# Patient Record
Sex: MALE | Race: WHITE | URBAN - METROPOLITAN AREA
[De-identification: names, ages, dates, MRNs, and addresses within clinical notes are randomized per-mention and may not be internally consistent; named-entity substitution may affect disease eponyms.]

---

## 2017-02-16 RX ORDER — LISINOPRIL 20 MG/1
TABLET ORAL
Qty: 30 TABLET | Refills: 2 | Status: SHIPPED | OUTPATIENT
Start: 2017-02-16 | End: 2017-06-20

## 2017-02-16 NOTE — TELEPHONE ENCOUNTER
Requesting Lisinopril refill, noted pt seen in Dr. Leighann Saeed office 10/18/16    Prescription refilled per [facility] refill protocol    Hypertensive Medications  Protocol Criteria:  · Appointment scheduled in the past 6 months or in the next 3 months  · BMP or CMP

## 2017-05-25 NOTE — TELEPHONE ENCOUNTER
Pt's LOV 4/26/16. Pt requesting refill. Please advise if you would prefer pt RTC or if you will refill.

## 2017-05-26 RX ORDER — MONTELUKAST SODIUM 10 MG/1
TABLET ORAL
Qty: 30 TABLET | Refills: 3 | Status: SHIPPED | OUTPATIENT
Start: 2017-05-26 | End: 2017-07-11 | Stop reason: ALTCHOICE

## 2017-05-31 NOTE — TELEPHONE ENCOUNTER
Hypertensive Medications  Protocol Criteria:  · Appointment scheduled in the past 6 months or in the next 3 months  · BMP or CMP in the past 12 months  · Creatinine result < 2  Recent Visits       Provider Department Primary Dx    1 year ago Kathya Coats

## 2017-06-02 RX ORDER — LISINOPRIL 20 MG/1
TABLET ORAL
Qty: 30 TABLET | Refills: 0 | OUTPATIENT
Start: 2017-06-02

## 2017-06-02 NOTE — TELEPHONE ENCOUNTER
Please contact patient. Remind him he overdue for fasting labs recommended at 10/2016 follow-up. Also overdue for 6 month hypertension check.   Okay to refill ×1 month if running out now, but must get these labs done

## 2017-06-17 ENCOUNTER — APPOINTMENT (OUTPATIENT)
Dept: LAB | Age: 62
End: 2017-06-17
Attending: FAMILY MEDICINE
Payer: COMMERCIAL

## 2017-06-17 DIAGNOSIS — E78.5 HYPERLIPIDEMIA, UNSPECIFIED HYPERLIPIDEMIA TYPE: ICD-10-CM

## 2017-06-17 DIAGNOSIS — R73.9 HYPERGLYCEMIA: ICD-10-CM

## 2017-06-17 PROCEDURE — 80076 HEPATIC FUNCTION PANEL: CPT

## 2017-06-17 PROCEDURE — 82947 ASSAY GLUCOSE BLOOD QUANT: CPT

## 2017-06-17 PROCEDURE — 83036 HEMOGLOBIN GLYCOSYLATED A1C: CPT

## 2017-06-17 PROCEDURE — 36415 COLL VENOUS BLD VENIPUNCTURE: CPT

## 2017-06-17 PROCEDURE — 80061 LIPID PANEL: CPT

## 2017-06-17 NOTE — TELEPHONE ENCOUNTER
HAZEL please contact pt to come in for NV for labs that are overdue. He is also due for a HTN follow up with [facility]. Meds refilled x 1 month will not be refilled if labs are not done.

## 2017-06-19 ENCOUNTER — TELEPHONE (OUTPATIENT)
Dept: FAMILY MEDICINE CLINIC | Facility: CLINIC | Age: 62
End: 2017-06-19

## 2017-06-19 NOTE — TELEPHONE ENCOUNTER
Contacted pt to schedule a 6 month follow up and during the call pt asked for his lab results. Please advise.

## 2017-06-19 NOTE — TELEPHONE ENCOUNTER
Dr. Zeke Mauricio can you advise on refill request. Pt had labs done on Saturday and states he will call back for a future appointment.

## 2017-06-19 NOTE — TELEPHONE ENCOUNTER
Pt already had lab work done on Saturday. Pt states he will call back to book an appt, but he is asking if his medication can be refilled please?

## 2017-06-20 RX ORDER — LISINOPRIL 20 MG/1
TABLET ORAL
Qty: 30 TABLET | Refills: 2 | Status: SHIPPED | OUTPATIENT
Start: 2017-06-20 | End: 2017-09-17

## 2017-06-20 NOTE — TELEPHONE ENCOUNTER
Verified pt name and . Reviewed test results and recommendations with pt as [facility] [place] [email or web address].  Pt states he had taken Atorvastatin in the past but thinks he had a side effect from med, had bad stomach cramps so he stopped ta

## 2017-07-11 ENCOUNTER — OFFICE VISIT (OUTPATIENT)
Dept: FAMILY MEDICINE CLINIC | Facility: CLINIC | Age: 62
End: 2017-07-11

## 2017-07-11 VITALS
HEART RATE: 71 BPM | DIASTOLIC BLOOD PRESSURE: 73 MMHG | WEIGHT: 204.38 LBS | SYSTOLIC BLOOD PRESSURE: 111 MMHG | TEMPERATURE: 98 F | HEIGHT: 71.3 IN | BODY MASS INDEX: 28.3 KG/M2 | RESPIRATION RATE: 17 BRPM

## 2017-07-11 DIAGNOSIS — Z86.010 HISTORY OF COLON POLYPS: ICD-10-CM

## 2017-07-11 DIAGNOSIS — R73.03 PREDIABETES: Primary | ICD-10-CM

## 2017-07-11 DIAGNOSIS — Z87.09 HISTORY OF NASAL POLYP: ICD-10-CM

## 2017-07-11 DIAGNOSIS — I10 ESSENTIAL HYPERTENSION WITH GOAL BLOOD PRESSURE LESS THAN 140/90: ICD-10-CM

## 2017-07-11 DIAGNOSIS — E78.5 HYPERLIPIDEMIA, UNSPECIFIED HYPERLIPIDEMIA TYPE: ICD-10-CM

## 2017-07-11 PROCEDURE — 99212 OFFICE O/P EST SF 10 MIN: CPT | Performed by: FAMILY MEDICINE

## 2017-07-11 PROCEDURE — 99214 OFFICE O/P EST MOD 30 MIN: CPT | Performed by: FAMILY MEDICINE

## 2017-07-11 RX ORDER — SIMVASTATIN 20 MG
20 TABLET ORAL NIGHTLY
Qty: 90 TABLET | Refills: 1 | Status: SHIPPED | OUTPATIENT
Start: 2017-07-11 | End: 2018-03-12 | Stop reason: ALTCHOICE

## 2017-07-11 NOTE — PROGRESS NOTES
HPI:    Patient ID: Yessy Dia is a 63year old male. HTN   This is a chronic problem. The current episode started more than 1 year ago. The problem is unchanged. The problem is controlled.  Pertinent negatives include no anxiety, blurred vision, jaylon Prediabetes–patient presents for follow-up on elevated blood sugars. Fasting blood sugar 112, hemoglobin A1c 5.9. He is overweight, no regular exercise. Has been on steroids intermittently in the past for nasal polyps.   Discussed diagnosis and recom

## 2017-07-17 ENCOUNTER — OFFICE VISIT (OUTPATIENT)
Dept: OTOLARYNGOLOGY | Facility: CLINIC | Age: 62
End: 2017-07-17

## 2017-07-17 VITALS
HEIGHT: 72 IN | TEMPERATURE: 98 F | WEIGHT: 204 LBS | SYSTOLIC BLOOD PRESSURE: 111 MMHG | DIASTOLIC BLOOD PRESSURE: 70 MMHG | BODY MASS INDEX: 27.63 KG/M2

## 2017-07-17 DIAGNOSIS — J33.9 NASAL POLYPOSIS: Primary | ICD-10-CM

## 2017-07-17 PROCEDURE — 99212 OFFICE O/P EST SF 10 MIN: CPT | Performed by: OTOLARYNGOLOGY

## 2017-07-17 PROCEDURE — 99214 OFFICE O/P EST MOD 30 MIN: CPT | Performed by: OTOLARYNGOLOGY

## 2017-07-17 RX ORDER — ASPIRIN 81 MG/1
TABLET ORAL
COMMUNITY
Start: 2017-07-11 | End: 2017-07-17

## 2017-07-17 RX ORDER — PREDNISONE 10 MG/1
TABLET ORAL
Qty: 44 TABLET | Refills: 0 | Status: SHIPPED | OUTPATIENT
Start: 2017-07-17 | End: 2018-03-12 | Stop reason: ALTCHOICE

## 2017-07-18 NOTE — PROGRESS NOTES
Margarette Ulloa is a 63year old male. Patient presents with:  Nose Problem: trouble breathing through both nostrils, left nostril is worse, pt states he has nasal polyps       HISTORY OF PRESENT ILLNESS  Long history of chronic sinusitis and polyposis.  Sh abated since then. Currently on Singulair and not using loratadine D or fluticasone. He wishes to discuss getting some steroids for this chronic problem. He also wishes to discuss surgery further.       Social History    Marital status:  SYSTEMS    System Neg/Pos Details   Constitutional Negative Fatigue, fever and weight loss. ENMT Negative Drooling. Eyes Negative Blurred vision and vision changes. Respiratory Negative Dyspnea and wheezing.    Cardio Negative Chest pain, irregular he Septum -Normal  Turbinates - Right: Normal, Left: Normal.  Bilateral nasal polyposis left greater than right        Current Outpatient Prescriptions:   •  predniSONE 10 MG Oral Tab, 6 tablets daily day one through 5, 4 tablets daily on days  6 and 7, 2 tab

## 2017-09-18 RX ORDER — LISINOPRIL 20 MG/1
TABLET ORAL
Qty: 30 TABLET | Refills: 2 | Status: SHIPPED | OUTPATIENT
Start: 2017-09-18 | End: 2017-12-14

## 2017-12-14 RX ORDER — LISINOPRIL 20 MG/1
TABLET ORAL
Qty: 30 TABLET | Refills: 0 | Status: SHIPPED | OUTPATIENT
Start: 2017-12-14 | End: 2018-02-18

## 2018-02-20 RX ORDER — LISINOPRIL 20 MG/1
TABLET ORAL
Qty: 30 TABLET | Refills: 0 | Status: SHIPPED | OUTPATIENT
Start: 2018-02-20 | End: 2018-03-12

## 2018-02-20 NOTE — TELEPHONE ENCOUNTER
Please contact patient to make appointment within the next month, obtain labs ordered 7/2017 fasting prior to appointment

## 2018-02-28 ENCOUNTER — APPOINTMENT (OUTPATIENT)
Dept: LAB | Age: 63
End: 2018-02-28
Attending: FAMILY MEDICINE
Payer: COMMERCIAL

## 2018-02-28 DIAGNOSIS — R73.03 PREDIABETES: ICD-10-CM

## 2018-02-28 DIAGNOSIS — E78.5 HYPERLIPIDEMIA, UNSPECIFIED HYPERLIPIDEMIA TYPE: ICD-10-CM

## 2018-02-28 LAB
ALBUMIN SERPL BCP-MCNC: 4.4 G/DL (ref 3.5–4.8)
ALBUMIN/GLOB SERPL: 1.6 {RATIO} (ref 1–2)
ALP SERPL-CCNC: 65 U/L (ref 32–100)
ALT SERPL-CCNC: 23 U/L (ref 17–63)
ANION GAP SERPL CALC-SCNC: 8 MMOL/L (ref 0–18)
AST SERPL-CCNC: 24 U/L (ref 15–41)
BILIRUB SERPL-MCNC: 1 MG/DL (ref 0.3–1.2)
BUN SERPL-MCNC: 16 MG/DL (ref 8–20)
BUN/CREAT SERPL: 18.2 (ref 10–20)
CALCIUM SERPL-MCNC: 9.5 MG/DL (ref 8.5–10.5)
CHLORIDE SERPL-SCNC: 102 MMOL/L (ref 95–110)
CHOLEST SERPL-MCNC: 221 MG/DL (ref 110–200)
CO2 SERPL-SCNC: 28 MMOL/L (ref 22–32)
CREAT SERPL-MCNC: 0.88 MG/DL (ref 0.5–1.5)
GLOBULIN PLAS-MCNC: 2.7 G/DL (ref 2.5–3.7)
GLUCOSE SERPL-MCNC: 80 MG/DL (ref 70–99)
HDLC SERPL-MCNC: 52 MG/DL
LDLC SERPL CALC-MCNC: 154 MG/DL (ref 0–99)
NONHDLC SERPL-MCNC: 169 MG/DL
OSMOLALITY UR CALC.SUM OF ELEC: 286 MOSM/KG (ref 275–295)
PATIENT FASTING: YES
POTASSIUM SERPL-SCNC: 4.2 MMOL/L (ref 3.3–5.1)
PROT SERPL-MCNC: 7.1 G/DL (ref 5.9–8.4)
SODIUM SERPL-SCNC: 138 MMOL/L (ref 136–144)
TRIGL SERPL-MCNC: 74 MG/DL (ref 1–149)

## 2018-02-28 PROCEDURE — 80061 LIPID PANEL: CPT

## 2018-02-28 PROCEDURE — P9045 ALBUMIN (HUMAN), 5%, 250 ML: HCPCS

## 2018-02-28 PROCEDURE — 83036 HEMOGLOBIN GLYCOSYLATED A1C: CPT

## 2018-02-28 PROCEDURE — 80053 COMPREHEN METABOLIC PANEL: CPT

## 2018-02-28 PROCEDURE — 36415 COLL VENOUS BLD VENIPUNCTURE: CPT

## 2018-03-01 LAB — HBA1C MFR BLD: 5.9 % (ref 4–6)

## 2018-03-12 ENCOUNTER — APPOINTMENT (OUTPATIENT)
Dept: LAB | Age: 63
End: 2018-03-12
Attending: FAMILY MEDICINE
Payer: COMMERCIAL

## 2018-03-12 ENCOUNTER — OFFICE VISIT (OUTPATIENT)
Dept: FAMILY MEDICINE CLINIC | Facility: CLINIC | Age: 63
End: 2018-03-12

## 2018-03-12 VITALS
RESPIRATION RATE: 17 BRPM | HEART RATE: 80 BPM | BODY MASS INDEX: 27.06 KG/M2 | TEMPERATURE: 98 F | WEIGHT: 199.81 LBS | DIASTOLIC BLOOD PRESSURE: 75 MMHG | SYSTOLIC BLOOD PRESSURE: 108 MMHG | HEIGHT: 72 IN

## 2018-03-12 DIAGNOSIS — Z00.00 ROUTINE PHYSICAL EXAMINATION: ICD-10-CM

## 2018-03-12 DIAGNOSIS — E78.5 HYPERLIPIDEMIA, UNSPECIFIED HYPERLIPIDEMIA TYPE: ICD-10-CM

## 2018-03-12 DIAGNOSIS — Z00.00 ROUTINE PHYSICAL EXAMINATION: Primary | ICD-10-CM

## 2018-03-12 DIAGNOSIS — I10 ESSENTIAL HYPERTENSION WITH GOAL BLOOD PRESSURE LESS THAN 140/90: ICD-10-CM

## 2018-03-12 DIAGNOSIS — Z87.09 HISTORY OF NASAL POLYP: ICD-10-CM

## 2018-03-12 DIAGNOSIS — Z86.010 HISTORY OF COLON POLYPS: ICD-10-CM

## 2018-03-12 DIAGNOSIS — R73.03 PREDIABETES: ICD-10-CM

## 2018-03-12 PROCEDURE — 99396 PREV VISIT EST AGE 40-64: CPT | Performed by: FAMILY MEDICINE

## 2018-03-12 PROCEDURE — 86803 HEPATITIS C AB TEST: CPT

## 2018-03-12 PROCEDURE — 36415 COLL VENOUS BLD VENIPUNCTURE: CPT

## 2018-03-12 RX ORDER — LISINOPRIL 20 MG/1
20 TABLET ORAL
Qty: 90 TABLET | Refills: 3 | Status: SHIPPED | OUTPATIENT
Start: 2018-03-12 | End: 2019-03-27

## 2018-03-12 RX ORDER — PRAVASTATIN SODIUM 40 MG
40 TABLET ORAL NIGHTLY
Qty: 90 TABLET | Refills: 1 | Status: SHIPPED | OUTPATIENT
Start: 2018-03-12 | End: 2018-09-24

## 2018-03-12 NOTE — PROGRESS NOTES
HPI:    Patient ID: Jean-Claude Terrell is a 63year old male. HPI    Review of Systems   Constitutional: Negative. HENT: Positive for congestion, sinus pain and sinus pressure. Respiratory: Negative. Cardiovascular: Negative.     Gastrointestinal: N walking. Hyperlipidemia–again discussed recommendation for statins.   Patient agrees to try pravastatin 20 mg daily (intolerant of atorvastatin in the past, did not try simvastatin)    Essential hypertension–well controlled with lisinopril 20 mg daily

## 2018-03-13 LAB — HCV AB SERPL QL IA: NONREACTIVE

## 2018-03-19 ENCOUNTER — OFFICE VISIT (OUTPATIENT)
Dept: OTOLARYNGOLOGY | Facility: CLINIC | Age: 63
End: 2018-03-19

## 2018-03-19 VITALS
DIASTOLIC BLOOD PRESSURE: 78 MMHG | HEIGHT: 72 IN | SYSTOLIC BLOOD PRESSURE: 117 MMHG | WEIGHT: 187 LBS | BODY MASS INDEX: 25.33 KG/M2 | TEMPERATURE: 98 F

## 2018-03-19 DIAGNOSIS — J33.9 NASAL POLYPOSIS: Primary | ICD-10-CM

## 2018-03-19 PROCEDURE — 99214 OFFICE O/P EST MOD 30 MIN: CPT | Performed by: OTOLARYNGOLOGY

## 2018-03-19 PROCEDURE — 99212 OFFICE O/P EST SF 10 MIN: CPT | Performed by: OTOLARYNGOLOGY

## 2018-03-19 RX ORDER — PREDNISONE 10 MG/1
TABLET ORAL
Qty: 44 TABLET | Refills: 0 | Status: SHIPPED | OUTPATIENT
Start: 2018-03-19 | End: 2018-04-16 | Stop reason: ALTCHOICE

## 2018-03-19 RX ORDER — MONTELUKAST SODIUM 10 MG/1
10 TABLET ORAL NIGHTLY
Qty: 30 TABLET | Refills: 3 | Status: SHIPPED | OUTPATIENT
Start: 2018-03-19 | End: 2018-05-19

## 2018-03-19 RX ORDER — AMOXICILLIN AND CLAVULANATE POTASSIUM 875; 125 MG/1; MG/1
1 TABLET, FILM COATED ORAL EVERY 12 HOURS
Qty: 20 TABLET | Refills: 0 | Status: SHIPPED | OUTPATIENT
Start: 2018-03-19 | End: 2018-04-16 | Stop reason: ALTCHOICE

## 2018-03-19 RX ORDER — ASPIRIN 81 MG/1
TABLET ORAL
Refills: 3 | COMMUNITY
Start: 2017-12-28 | End: 2018-03-19

## 2018-03-19 RX ORDER — FLUTICASONE PROPIONATE 50 MCG
1 SPRAY, SUSPENSION (ML) NASAL 2 TIMES DAILY
Qty: 1 BOTTLE | Refills: 3 | Status: SHIPPED | OUTPATIENT
Start: 2018-03-19 | End: 2019-04-01

## 2018-03-20 NOTE — PROGRESS NOTES
Megan Villalobos is a 63year old male. Patient presents with:  Nose Problem: chronic nasal polyps       HISTORY OF PRESENT ILLNESS  Long history of chronic sinusitis and polyposis. She did have a previous polypectomy over 20 years ago. He has been offered r fluticasone. He wishes to discuss getting some steroids for this chronic problem.   He also wishes to discuss surgery further  3/19/18 about 9 months ago he was treated with Singulair loratadine fluticasone and Augmentin with a steroid burst and taper with [address] SINUS SURGERY  2007: COLONOSCOPY & POLYPECTOMY      Comment: colonic polyps, tubular adenoma  1971: DRAINAGE OF HEMATOMA/FLUID      Comment: surgical drainage, scrotal traumatic hematoma  10/12/2012: ELECTROCARDIOGRAM, COMPLETE      Com Normal, Left: Normal. TM - Right: Normal, Left: Normal.   Skin Normal Inspection - Normal.        Lymph Detail Normal Submental. Submandibular. Anterior cervical. Posterior cervical. Supraclavicular.         Nose/Mouth/Throat Normal External nose - Normal. to the severity of his nasal polyposis and his previous history of sinus surgery in the past.  Return to see me as needed. - ENT - EXTERNAL            Vick Hdez MD    3/19/2018    7:05 PM

## 2018-04-16 ENCOUNTER — TELEPHONE (OUTPATIENT)
Dept: GASTROENTEROLOGY | Facility: CLINIC | Age: 63
End: 2018-04-16

## 2018-04-16 ENCOUNTER — OFFICE VISIT (OUTPATIENT)
Dept: GASTROENTEROLOGY | Facility: CLINIC | Age: 63
End: 2018-04-16

## 2018-04-16 VITALS
HEART RATE: 72 BPM | SYSTOLIC BLOOD PRESSURE: 123 MMHG | DIASTOLIC BLOOD PRESSURE: 77 MMHG | WEIGHT: 204 LBS | BODY MASS INDEX: 27.63 KG/M2 | HEIGHT: 72 IN

## 2018-04-16 DIAGNOSIS — T88.52XD: ICD-10-CM

## 2018-04-16 DIAGNOSIS — Z12.11 SCREEN FOR COLON CANCER: ICD-10-CM

## 2018-04-16 DIAGNOSIS — Z87.09 HISTORY OF NASAL POLYP: ICD-10-CM

## 2018-04-16 DIAGNOSIS — Z86.010 HISTORY OF COLON POLYPS: Primary | ICD-10-CM

## 2018-04-16 PROBLEM — T88.52XA FAILED CONSCIOUS SEDATION DURING PROCEDURE: Status: ACTIVE | Noted: 2018-04-16

## 2018-04-16 PROCEDURE — 99243 OFF/OP CNSLTJ NEW/EST LOW 30: CPT | Performed by: INTERNAL MEDICINE

## 2018-04-16 PROCEDURE — 99212 OFFICE O/P EST SF 10 MIN: CPT | Performed by: INTERNAL MEDICINE

## 2018-04-16 RX ORDER — LISINOPRIL 20 MG/1
TABLET ORAL
Qty: 30 TABLET | Refills: 11 | Status: SHIPPED | OUTPATIENT
Start: 2018-04-16 | End: 2019-03-28

## 2018-04-16 NOTE — TELEPHONE ENCOUNTER
Scheduled for:  Colonoscopy 549[address]  Provider Name: Dr. Grajeda Branden  Date:  5/14/18  Location:  [facility]  Sedation:  MAC  Time:  1:00 pm, (pt is aware that [facility] will call the day before to confirm arrival time)  Prep: Suprep  Meds/Allergies Reconciled?:  Physician review

## 2018-04-16 NOTE — PATIENT INSTRUCTIONS
1. Schedule colonoscopy with MAC ([facility])    2.  bowel prep from pharmacy (split suprep)    3. Continue all medications for procedure    4. Read all bowel prep instructions carefully    5.  AVOID seeds, nuts, popcorn, raw fruits and vegetables (cooked i

## 2018-04-16 NOTE — H&P
[address] [facility] [address] Gastroenterology                                                                                                  Clinic History and Physical     Pa SCOPE,BX/RMV POLYP/DEBRID      Comment: polypectomy, nasal polyps   Family Hx:   Family History   Problem Relation Age of Onset   • Pulmonary Disease Father 80     COPD (cause of death)   • Breast Cancer Mother 72     (66 onset; cause of death)   • Heart A negative for hay fever  ENDOCRINE:  negative for cold intolerance and heat intolerance  MUSCULOSKELETAL:  negative for joint effusion/severe erythema  BEHAVIOR/PSYCH:  negative for psychotic behavior      PHYSICAL EXAM:   Blood pressure 123/77, pulse 72, h the patient’s satisfaction. The patient signed informed consent and elected to proceed with colonoscopy with intervention [i.e. polypectomy, stent placement, etc.] as indicated.       Orders This Visit:  No orders of the defined types were placed in this en

## 2018-05-11 ENCOUNTER — TELEPHONE (OUTPATIENT)
Dept: GASTROENTEROLOGY | Facility: CLINIC | Age: 63
End: 2018-05-11

## 2018-05-11 NOTE — TELEPHONE ENCOUNTER
Kristyn/[facility] scheduling, indicates they have been trying to reach out to pt at both [facility] on chart, unable to contact pt for ana of cln/proc ana 5/14, pls call at:[phone],thanks.

## 2018-05-11 NOTE — TELEPHONE ENCOUNTER
Left detailed message on pt home/mobile number to contact the [facility] asap for his procedure scheduled 5/14/18. Attempted to reach him at work number, no answer and VM is full. Emergency contact number, Tim Slade, is the same as his home number.   [employer]

## 2018-05-14 ENCOUNTER — LAB REQUISITION (OUTPATIENT)
Dept: LAB | Facility: HOSPITAL | Age: 63
End: 2018-05-14
Payer: COMMERCIAL

## 2018-05-14 DIAGNOSIS — Z01.89 ENCOUNTER FOR OTHER SPECIFIED SPECIAL EXAMINATIONS: ICD-10-CM

## 2018-05-14 PROCEDURE — 88305 TISSUE EXAM BY PATHOLOGIST: CPT | Performed by: INTERNAL MEDICINE

## 2018-05-18 ENCOUNTER — TELEPHONE (OUTPATIENT)
Dept: GASTROENTEROLOGY | Facility: CLINIC | Age: 63
End: 2018-05-18

## 2018-05-18 NOTE — TELEPHONE ENCOUNTER
I mailed out colonoscopy results letter to pt  Updated health maintenance  Entered into  3 yr recall  Recall colon in 3 years per.  Colon done 5/14/18    GI staff: please place recall for colonoscopy in 3 years

## 2018-05-19 NOTE — TELEPHONE ENCOUNTER
•  Montelukast Sodium 10 MG Oral Tab, Take 1 tablet (10 mg total) by mouth nightly., Disp: 30 tablet, Rfl: 3

## 2018-05-21 RX ORDER — MONTELUKAST SODIUM 10 MG/1
10 TABLET ORAL NIGHTLY
Qty: 30 TABLET | Refills: 3 | Status: SHIPPED | OUTPATIENT
Start: 2018-05-21 | End: 2018-09-24

## 2018-09-24 RX ORDER — PRAVASTATIN SODIUM 40 MG
TABLET ORAL
Qty: 90 TABLET | Refills: 0 | Status: SHIPPED | OUTPATIENT
Start: 2018-09-24 | End: 2018-12-26

## 2018-09-24 RX ORDER — MONTELUKAST SODIUM 10 MG/1
TABLET ORAL
Qty: 90 TABLET | Refills: 3 | Status: SHIPPED | OUTPATIENT
Start: 2018-09-24 | End: 2020-01-02

## 2018-11-20 RX ORDER — MONTELUKAST SODIUM 10 MG/1
TABLET ORAL
Qty: 30 TABLET | Refills: 0 | OUTPATIENT
Start: 2018-11-20

## 2018-12-26 RX ORDER — PRAVASTATIN SODIUM 40 MG
TABLET ORAL
Qty: 90 TABLET | Refills: 0 | Status: SHIPPED | OUTPATIENT
Start: 2018-12-26 | End: 2019-03-27

## 2019-01-02 NOTE — TELEPHONE ENCOUNTER
•  MONTELUKAST SODIUM 10 MG Oral Tab, TAKE 1 TABLET(10 MG) BY MOUTH EVERY NIGHT, Disp: 90 tablet, Rfl: 3      RX refill received from [facility], [address]:  3/19/18    Last Refill:  6/13/18    Please advise

## 2019-01-03 RX ORDER — MONTELUKAST SODIUM 10 MG/1
10 TABLET ORAL NIGHTLY
Qty: 30 TABLET | Refills: 3 | Status: SHIPPED | OUTPATIENT
Start: 2019-01-03 | End: 2019-04-01

## 2019-03-28 RX ORDER — PRAVASTATIN SODIUM 40 MG
TABLET ORAL
Qty: 90 TABLET | Refills: 1 | Status: SHIPPED | OUTPATIENT
Start: 2019-03-28 | End: 2019-09-27

## 2019-03-28 RX ORDER — LISINOPRIL 20 MG/1
TABLET ORAL
Qty: 90 TABLET | Refills: 1 | Status: SHIPPED | OUTPATIENT
Start: 2019-03-28 | End: 2019-09-27

## 2019-03-28 NOTE — TELEPHONE ENCOUNTER
Please call patient and schedule appt, one refill sent but MUST make fasting physical appt at this time

## 2019-04-01 ENCOUNTER — OFFICE VISIT (OUTPATIENT)
Dept: FAMILY MEDICINE CLINIC | Facility: CLINIC | Age: 64
End: 2019-04-01
Payer: COMMERCIAL

## 2019-04-01 ENCOUNTER — APPOINTMENT (OUTPATIENT)
Dept: LAB | Age: 64
End: 2019-04-01
Attending: FAMILY MEDICINE
Payer: COMMERCIAL

## 2019-04-01 VITALS
SYSTOLIC BLOOD PRESSURE: 115 MMHG | WEIGHT: 200.63 LBS | HEART RATE: 64 BPM | RESPIRATION RATE: 18 BRPM | TEMPERATURE: 98 F | DIASTOLIC BLOOD PRESSURE: 74 MMHG | BODY MASS INDEX: 27 KG/M2

## 2019-04-01 DIAGNOSIS — I10 ESSENTIAL HYPERTENSION: Primary | ICD-10-CM

## 2019-04-01 DIAGNOSIS — J33.9 NASAL POLYPS: ICD-10-CM

## 2019-04-01 DIAGNOSIS — I10 ESSENTIAL HYPERTENSION: ICD-10-CM

## 2019-04-01 PROCEDURE — 99212 OFFICE O/P EST SF 10 MIN: CPT | Performed by: FAMILY MEDICINE

## 2019-04-01 PROCEDURE — 36415 COLL VENOUS BLD VENIPUNCTURE: CPT

## 2019-04-01 PROCEDURE — 99214 OFFICE O/P EST MOD 30 MIN: CPT | Performed by: FAMILY MEDICINE

## 2019-04-01 PROCEDURE — 80061 LIPID PANEL: CPT

## 2019-04-01 PROCEDURE — 80053 COMPREHEN METABOLIC PANEL: CPT

## 2019-04-01 NOTE — PROGRESS NOTES
HPI:    Patient ID: Dawit Peña is a 66year old male. Hyperlipidemia   This is a chronic problem. The current episode started more than 1 year ago. He has no history of chronic renal disease.  There are no known factors aggravating his hyperlipidemia exercise. Check fasting labs today. Follow-up for routine physical in 6 months. Nasal polyps–with chronic nasal congestion. Had one surgery approximately 1990. Polyps recurred after 6 months. Trying to decide whether to pursue a second surgery.   Maddi Hammer

## 2019-04-08 ENCOUNTER — OFFICE VISIT (OUTPATIENT)
Dept: OTOLARYNGOLOGY | Facility: CLINIC | Age: 64
End: 2019-04-08
Payer: COMMERCIAL

## 2019-04-08 VITALS
SYSTOLIC BLOOD PRESSURE: 107 MMHG | BODY MASS INDEX: 26.28 KG/M2 | DIASTOLIC BLOOD PRESSURE: 68 MMHG | HEIGHT: 72 IN | WEIGHT: 194 LBS

## 2019-04-08 DIAGNOSIS — J33.9 NASAL POLYPOSIS: Primary | ICD-10-CM

## 2019-04-08 PROCEDURE — 99212 OFFICE O/P EST SF 10 MIN: CPT | Performed by: OTOLARYNGOLOGY

## 2019-04-08 PROCEDURE — 99214 OFFICE O/P EST MOD 30 MIN: CPT | Performed by: OTOLARYNGOLOGY

## 2019-04-08 RX ORDER — MONTELUKAST SODIUM 10 MG/1
10 TABLET ORAL NIGHTLY
Qty: 30 TABLET | Refills: 3 | Status: SHIPPED | OUTPATIENT
Start: 2019-04-08 | End: 2019-08-31

## 2019-04-08 RX ORDER — AZELASTINE 1 MG/ML
2 SPRAY, METERED NASAL 2 TIMES DAILY
Qty: 1 BOTTLE | Refills: 0 | Status: SHIPPED | OUTPATIENT
Start: 2019-04-08 | End: 2021-05-10 | Stop reason: ALTCHOICE

## 2019-04-08 RX ORDER — AMOXICILLIN AND CLAVULANATE POTASSIUM 875; 125 MG/1; MG/1
1 TABLET, FILM COATED ORAL EVERY 12 HOURS
Qty: 20 TABLET | Refills: 0 | Status: SHIPPED | OUTPATIENT
Start: 2019-04-08 | End: 2019-10-01 | Stop reason: ALTCHOICE

## 2019-04-08 RX ORDER — PREDNISONE 10 MG/1
TABLET ORAL
Qty: 44 TABLET | Refills: 0 | Status: SHIPPED | OUTPATIENT
Start: 2019-04-08 | End: 2021-05-10 | Stop reason: ALTCHOICE

## 2019-04-09 NOTE — PROGRESS NOTES
Echo Lara is a 66year old male. Patient presents with: Follow - Up: regarding nasal polyposis, pt did not meet with Dr Ele Chun as advised       HISTORY OF PRESENT ILLNESS  Long history of chronic sinusitis and polyposis.  She did have a previous  Currently on Singulair and not using loratadine D or fluticasone.  He wishes to discuss getting some steroids for this chronic problem. Eloise Foley also wishes to discuss surgery further  3/19/18 about 9 months ago he was treated with Singulair loratadine flutica Attack Brother 46        (cause of death, 46)       Past Medical History:   Diagnosis Date   • Blunt trauma of forehead 1972    Ran into a pole and hit right above the [LEFT] brow (ophthalmology).    • Hyperlipidemia    • Nasal polyps     polypectomy   • Op Normal, Left: Normal. Fundus - Right: Normal, Left: Normal.   Neurological Normal Memory - Normal. Cranial nerves - Cranial nerves II through XII grossly intact.    Head/Face Normal Facial features - Normal. Eyebrows - Normal. Skull - Normal.        Nasopha Nasal polyposis  Continued nasal polyposis. He is having an exacerbation at this time. I have asked him to start Augmentin for 10 days as well as a prednisone burst and taper. He will resume the use of Singulair Astelin nasal spray and Allegra-D.   Retur

## 2019-09-03 RX ORDER — MONTELUKAST SODIUM 10 MG/1
TABLET ORAL
Qty: 30 TABLET | Refills: 3 | Status: SHIPPED | OUTPATIENT
Start: 2019-09-03 | End: 2019-10-01

## 2019-09-29 RX ORDER — PRAVASTATIN SODIUM 40 MG
TABLET ORAL
Qty: 90 TABLET | Refills: 1 | Status: SHIPPED | OUTPATIENT
Start: 2019-09-29 | End: 2019-10-01

## 2019-09-29 RX ORDER — LISINOPRIL 20 MG/1
TABLET ORAL
Qty: 90 TABLET | Refills: 1 | Status: SHIPPED | OUTPATIENT
Start: 2019-09-29 | End: 2019-10-01

## 2019-09-29 NOTE — TELEPHONE ENCOUNTER
Refill passed per [facility], [facility] protocol.   Cholesterol Medications  Protocol Criteria:  · Appointment scheduled in the past 12 months or in the next 3 months  · ALT & LDL on file in the past 12 months  · ALT result < 80  · LDL result <130   Recent Outpat Celeste Wynne MD [facility], [facility], [address], [facility]        Lab Results   Component Value Date     (H) 04/01/2019    BUN 21 (H) 04/01/2019    CREATSERUM 0.94 04/01/2019    BUNCREA 22.3 (H) 04/01/2019    GFRNAA 87 04/01/2019    GFRAA 101 04/01/2019

## 2019-10-01 ENCOUNTER — APPOINTMENT (OUTPATIENT)
Dept: LAB | Age: 64
End: 2019-10-01
Attending: FAMILY MEDICINE
Payer: COMMERCIAL

## 2019-10-01 ENCOUNTER — OFFICE VISIT (OUTPATIENT)
Dept: FAMILY MEDICINE CLINIC | Facility: CLINIC | Age: 64
End: 2019-10-01
Payer: COMMERCIAL

## 2019-10-01 VITALS
SYSTOLIC BLOOD PRESSURE: 113 MMHG | HEART RATE: 73 BPM | HEIGHT: 71.5 IN | DIASTOLIC BLOOD PRESSURE: 72 MMHG | RESPIRATION RATE: 18 BRPM | TEMPERATURE: 98 F | BODY MASS INDEX: 27.88 KG/M2 | WEIGHT: 203.63 LBS

## 2019-10-01 DIAGNOSIS — J33.9 NASAL POLYPS: ICD-10-CM

## 2019-10-01 DIAGNOSIS — Z86.010 HISTORY OF COLON POLYPS: ICD-10-CM

## 2019-10-01 DIAGNOSIS — Z00.00 ROUTINE PHYSICAL EXAMINATION: Primary | ICD-10-CM

## 2019-10-01 DIAGNOSIS — Z12.11 COLON CANCER SCREENING: ICD-10-CM

## 2019-10-01 DIAGNOSIS — R73.03 PREDIABETES: ICD-10-CM

## 2019-10-01 DIAGNOSIS — I10 ESSENTIAL HYPERTENSION WITH GOAL BLOOD PRESSURE LESS THAN 140/90: ICD-10-CM

## 2019-10-01 PROCEDURE — 99396 PREV VISIT EST AGE 40-64: CPT | Performed by: FAMILY MEDICINE

## 2019-10-01 RX ORDER — LISINOPRIL 20 MG/1
TABLET ORAL
Qty: 90 TABLET | Refills: 3 | Status: SHIPPED | OUTPATIENT
Start: 2019-10-01 | End: 2020-04-05

## 2019-10-01 RX ORDER — PRAVASTATIN SODIUM 40 MG
TABLET ORAL
Qty: 90 TABLET | Refills: 3 | Status: SHIPPED | OUTPATIENT
Start: 2019-10-01 | End: 2021-01-04

## 2019-10-01 NOTE — PROGRESS NOTES
HPI:    Patient ID: Chas Chavez is a 60year old male. HPI    Review of Systems   Constitutional: Negative. HENT: Positive for congestion. Respiratory: Negative. Cardiovascular: Negative. Gastrointestinal: Negative. Skin: Negative. labs done today. He will get flu vaccination at work.     Essential hypertension with goal blood pressure less than 140/90–pressure excellent on lisinopril, continue current medication    History of colon polyps– due for colonoscopy 2021    Prediabetes– di

## 2020-01-02 RX ORDER — MONTELUKAST SODIUM 10 MG/1
TABLET ORAL
Qty: 30 TABLET | Refills: 3 | Status: SHIPPED | OUTPATIENT
Start: 2020-01-02 | End: 2021-05-10 | Stop reason: ALTCHOICE

## 2020-04-05 RX ORDER — LISINOPRIL 20 MG/1
TABLET ORAL
Qty: 90 TABLET | Refills: 0 | Status: SHIPPED | OUTPATIENT
Start: 2020-04-05 | End: 2021-01-04

## 2020-04-07 ENCOUNTER — OFFICE VISIT (OUTPATIENT)
Dept: OTHER | Facility: HOSPITAL | Age: 65
End: 2020-04-07
Attending: FAMILY MEDICINE

## 2020-04-07 DIAGNOSIS — Z00.00 ROUTINE GENERAL MEDICAL EXAMINATION AT A HEALTH CARE FACILITY: Primary | ICD-10-CM

## 2020-04-07 PROCEDURE — 86480 TB TEST CELL IMMUN MEASURE: CPT

## 2020-07-22 ENCOUNTER — TELEPHONE (OUTPATIENT)
Dept: FAMILY MEDICINE CLINIC | Facility: CLINIC | Age: 65
End: 2020-07-22

## 2020-07-22 NOTE — TELEPHONE ENCOUNTER
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 Denies any known exposures. Asymptomatic at this time.   He is also asking

## 2020-07-23 NOTE — TELEPHONE ENCOUNTER
Spoke with pt,  verified, Pt informed of MD recommendation, pt stated understanding. No future appointments.

## 2020-07-23 NOTE — TELEPHONE ENCOUNTER
Please contact patient. Just had clarification from [facility]. They report that they do not do nasopharyngeal swabs which go back further. They only do nasal swab which is more in the front of the nose.   This might be better tolerated by him, also the swab

## 2020-07-23 NOTE — TELEPHONE ENCOUNTER
Please let patient know [facility] does not offer testing other than nasopharyngeal swab.   Let me know if he plans on doing test at [facility] and I will order

## 2021-01-04 RX ORDER — LISINOPRIL 20 MG/1
TABLET ORAL
Qty: 90 TABLET | Refills: 0 | Status: SHIPPED | OUTPATIENT
Start: 2021-01-04 | End: 2021-04-06

## 2021-01-04 RX ORDER — PRAVASTATIN SODIUM 40 MG
TABLET ORAL
Qty: 90 TABLET | Refills: 3 | Status: SHIPPED | OUTPATIENT
Start: 2021-01-04 | End: 2022-01-12

## 2021-01-04 NOTE — TELEPHONE ENCOUNTER
He is due for appointment and bloodwork.   Please have him schedule and then route back to me for temp refill

## 2021-04-06 RX ORDER — LISINOPRIL 20 MG/1
TABLET ORAL
Qty: 90 TABLET | Refills: 0 | Status: SHIPPED | OUTPATIENT
Start: 2021-04-06 | End: 2021-07-19

## 2021-04-06 NOTE — TELEPHONE ENCOUNTER
Contact patient to make appointment for routine physical with fasting labs. Then send back to me for refill.

## 2021-04-14 ENCOUNTER — TELEPHONE (OUTPATIENT)
Dept: GASTROENTEROLOGY | Facility: CLINIC | Age: 66
End: 2021-04-14

## 2021-04-14 NOTE — TELEPHONE ENCOUNTER
----- Message from Hitesh Rucker, [address] [place], [place] [address]marley sent at 5/18/2018  4:47 PM CDT -----  Regarding: 3 yr CLN recall  Recall colon in 3 years per.  Colon done 5/14/18

## 2021-05-10 ENCOUNTER — OFFICE VISIT (OUTPATIENT)
Dept: FAMILY MEDICINE CLINIC | Facility: CLINIC | Age: 66
End: 2021-05-10
Payer: COMMERCIAL

## 2021-05-10 ENCOUNTER — LAB ENCOUNTER (OUTPATIENT)
Dept: LAB | Age: 66
End: 2021-05-10
Attending: FAMILY MEDICINE
Payer: COMMERCIAL

## 2021-05-10 VITALS
RESPIRATION RATE: 18 BRPM | TEMPERATURE: 97 F | BODY MASS INDEX: 28.81 KG/M2 | HEART RATE: 74 BPM | HEIGHT: 71.5 IN | SYSTOLIC BLOOD PRESSURE: 106 MMHG | DIASTOLIC BLOOD PRESSURE: 68 MMHG | WEIGHT: 210.38 LBS

## 2021-05-10 DIAGNOSIS — Z86.010 HISTORY OF COLON POLYPS: ICD-10-CM

## 2021-05-10 DIAGNOSIS — Z00.00 ROUTINE PHYSICAL EXAMINATION: Primary | ICD-10-CM

## 2021-05-10 DIAGNOSIS — I10 ESSENTIAL HYPERTENSION WITH GOAL BLOOD PRESSURE LESS THAN 140/90: ICD-10-CM

## 2021-05-10 DIAGNOSIS — R73.03 PREDIABETES: ICD-10-CM

## 2021-05-10 DIAGNOSIS — Z00.00 ROUTINE PHYSICAL EXAMINATION: ICD-10-CM

## 2021-05-10 DIAGNOSIS — Z87.09 HISTORY OF NASAL POLYP: ICD-10-CM

## 2021-05-10 PROCEDURE — 3078F DIAST BP <80 MM HG: CPT | Performed by: FAMILY MEDICINE

## 2021-05-10 PROCEDURE — 80061 LIPID PANEL: CPT

## 2021-05-10 PROCEDURE — 3074F SYST BP LT 130 MM HG: CPT | Performed by: FAMILY MEDICINE

## 2021-05-10 PROCEDURE — 83036 HEMOGLOBIN GLYCOSYLATED A1C: CPT

## 2021-05-10 PROCEDURE — 99396 PREV VISIT EST AGE 40-64: CPT | Performed by: FAMILY MEDICINE

## 2021-05-10 PROCEDURE — 90750 HZV VACC RECOMBINANT IM: CPT | Performed by: FAMILY MEDICINE

## 2021-05-10 PROCEDURE — 84153 ASSAY OF PSA TOTAL: CPT | Performed by: FAMILY MEDICINE

## 2021-05-10 PROCEDURE — 3008F BODY MASS INDEX DOCD: CPT | Performed by: FAMILY MEDICINE

## 2021-05-10 PROCEDURE — 80053 COMPREHEN METABOLIC PANEL: CPT

## 2021-05-10 PROCEDURE — 85027 COMPLETE CBC AUTOMATED: CPT

## 2021-05-10 PROCEDURE — 36415 COLL VENOUS BLD VENIPUNCTURE: CPT

## 2021-05-10 PROCEDURE — 90471 IMMUNIZATION ADMIN: CPT | Performed by: FAMILY MEDICINE

## 2021-05-10 RX ORDER — PRENATAL VIT 91/IRON/FOLIC/DHA 28-975-200
1 COMBINATION PACKAGE (EA) ORAL 2 TIMES DAILY
Qty: 42 G | Refills: 1 | Status: SHIPPED | OUTPATIENT
Start: 2021-05-10 | End: 2021-06-15

## 2021-05-10 NOTE — PROGRESS NOTES
HPI/Subjective:   Patient ID: Yisel Vigil is a 63year old male. Patient presents for routine physical.      History/Other:   Review of Systems   Constitutional: Negative. Respiratory: Negative. Cardiovascular: Negative.     Gastrointestinal: Ne colonoscopy, referral given. History of nasal polyp–in 2020 had repeat nasal polyps surgery with Dr. Mariela Walker. Significant improvement in breathing. Minimal improvement in taste/smell. At this time continuing with budesonide nasal saline rinse.

## 2021-06-15 ENCOUNTER — OFFICE VISIT (OUTPATIENT)
Dept: FAMILY MEDICINE CLINIC | Facility: CLINIC | Age: 66
End: 2021-06-15
Payer: COMMERCIAL

## 2021-06-15 VITALS
BODY MASS INDEX: 27.34 KG/M2 | WEIGHT: 199.63 LBS | HEIGHT: 71.5 IN | RESPIRATION RATE: 16 BRPM | SYSTOLIC BLOOD PRESSURE: 114 MMHG | DIASTOLIC BLOOD PRESSURE: 75 MMHG | TEMPERATURE: 97 F | HEART RATE: 92 BPM

## 2021-06-15 DIAGNOSIS — R73.9 STEROID-INDUCED HYPERGLYCEMIA: Primary | ICD-10-CM

## 2021-06-15 DIAGNOSIS — T38.0X5A STEROID-INDUCED HYPERGLYCEMIA: Primary | ICD-10-CM

## 2021-06-15 DIAGNOSIS — B35.6 TINEA CRURIS: ICD-10-CM

## 2021-06-15 DIAGNOSIS — B35.3 TINEA PEDIS, UNSPECIFIED LATERALITY: ICD-10-CM

## 2021-06-15 PROCEDURE — 3074F SYST BP LT 130 MM HG: CPT | Performed by: FAMILY MEDICINE

## 2021-06-15 PROCEDURE — 99213 OFFICE O/P EST LOW 20 MIN: CPT | Performed by: FAMILY MEDICINE

## 2021-06-15 PROCEDURE — 3078F DIAST BP <80 MM HG: CPT | Performed by: FAMILY MEDICINE

## 2021-06-15 PROCEDURE — 3008F BODY MASS INDEX DOCD: CPT | Performed by: FAMILY MEDICINE

## 2021-06-15 RX ORDER — PRENATAL VIT 91/IRON/FOLIC/DHA 28-975-200
1 COMBINATION PACKAGE (EA) ORAL 2 TIMES DAILY
Qty: 42 G | Refills: 1 | COMMUNITY
Start: 2021-06-15

## 2021-06-16 NOTE — PROGRESS NOTES
HPI/Subjective:   Patient ID: Maddie Tyler is a 63year old male. Patient presents to follow-up on elevated hemoglobin A1c as below.       History/Other:   Review of Systems  Current Outpatient Medications   Medication Sig Dispense Refill   • [facility] Glucose, Serum      Hemoglobin A1C      Meds This Visit:  Requested Prescriptions      No prescriptions requested or ordered in this encounter       Imaging & Referrals:  None

## 2021-07-08 ENCOUNTER — TELEPHONE (OUTPATIENT)
Dept: GASTROENTEROLOGY | Facility: CLINIC | Age: 66
End: 2021-07-08

## 2021-07-08 DIAGNOSIS — Z86.010 HX OF COLONIC POLYPS: Primary | ICD-10-CM

## 2021-07-08 NOTE — TELEPHONE ENCOUNTER
Last Procedure, Date, MD: Colonoscopy, 5/14/18, Dr. Vivien Bower   Last Diagnosis: tubular adenoma    Recalled for (mth/yrs): 3 years  Sedation used previously: MAC   Last Prep Used (if known): suprep  Quality of prep (if known): excellent  Anticoagulants: n/a  D

## 2021-07-08 NOTE — TELEPHONE ENCOUNTER
-  -  -  Final Diagnosis:      A. Ascending colon polyp; polypectomy:  · Fragment of tubular adenoma (1.0 cm in maximum dimension). · No evidence of severe dysplasia/carcinoma in situ or infiltrating carcinoma identified.     B.   Transverse colon polyp;

## 2021-07-14 RX ORDER — POLYETHYLENE GLYCOL 3350, SODIUM CHLORIDE, SODIUM BICARBONATE, POTASSIUM CHLORIDE 420; 11.2; 5.72; 1.48 G/4L; G/4L; G/4L; G/4L
POWDER, FOR SOLUTION ORAL
Qty: 4000 ML | Refills: 0 | Status: SHIPPED | OUTPATIENT
Start: 2021-07-14

## 2021-07-19 RX ORDER — LISINOPRIL 20 MG/1
20 TABLET ORAL DAILY
Qty: 90 TABLET | Refills: 3 | Status: SHIPPED | OUTPATIENT
Start: 2021-07-19

## 2021-07-19 NOTE — TELEPHONE ENCOUNTER
Scheduled for:  Colonoscopy [address]121  Provider Name:  Dr. Alejandrina Rodrigues  Date:  9/13/21  Location:  [facility]  Sedation:  MAC  Time:  10:45am (pt is aware that [facility] will call the day before to confirm arrival time)   Prep:  TRilyte  Meds/Allergies Reconciled?:  Physician dasha

## 2021-07-19 NOTE — TELEPHONE ENCOUNTER
Verified name and . Patient is requesting refill on Lisinopril. He states that he only has 2 pills left.      Medication pended for your review and approval.

## 2021-07-23 NOTE — TELEPHONE ENCOUNTER
Disp Refills Start End    lisinopril 20 MG Oral Tab 90 tablet 3 7/19/2021     Sig - Route:  Take 1 tablet (20 mg total) by mouth daily. - Oral    Sent to pharmacy as: Lisinopril 20 MG Oral Tablet    E-Prescribing Status: Receipt confirmed by pharmacy (7/19

## 2021-09-07 ENCOUNTER — TELEPHONE (OUTPATIENT)
Dept: GASTROENTEROLOGY | Facility: CLINIC | Age: 66
End: 2021-09-07

## 2021-09-07 NOTE — TELEPHONE ENCOUNTER
Perry/PEGGY called to speak to RN about coverage for pts 9/13/21 colonoscopy. Pt is out of network and they would like to know if Dr. Monet Mike will do a PPO waiver. Please call. Ref # for call is [id number].

## 2021-09-07 NOTE — TELEPHONE ENCOUNTER
Contacted [facility] and spoke to [facility]   Ref #[id number]    I informed him I was calling regarding the message below taken by phone room. I provided call reference number but he was unable to find any data with it.      I confirmed that Dr. Julianna Esquivel is in network

## 2021-09-09 NOTE — TELEPHONE ENCOUNTER
Patient requesting to speak with RN regarding BCBS issue. His concerns are whether Dr Paula Arriaza and the Anesthesiologist are within his insurance network. Please call to discuss prior to 9/10/2021 COVID19. Thank you.

## 2021-09-09 NOTE — TELEPHONE ENCOUNTER
Contacted patient and discussed insurance questions he had regarding upcoming procedure. I let him know when I spoke to Sabine Martines they informed me Dr. Lucy Medina was in network.  I also provided number for [facility] anest[facility]iologists so patient can follow up to se

## 2021-09-10 ENCOUNTER — LAB REQUISITION (OUTPATIENT)
Dept: SURGERY | Age: 66
End: 2021-09-10
Payer: COMMERCIAL

## 2021-09-10 DIAGNOSIS — Z01.818 PREOP EXAMINATION: ICD-10-CM

## 2021-09-10 LAB — SARS-COV-2 RNA RESP QL NAA+PROBE: NOT DETECTED

## 2021-09-13 ENCOUNTER — SURGERY CENTER DOCUMENTATION (OUTPATIENT)
Dept: SURGERY | Age: 66
End: 2021-09-13

## 2021-09-13 DIAGNOSIS — K57.90 DIVERTICULOSIS: ICD-10-CM

## 2021-09-13 PROCEDURE — 45378 DIAGNOSTIC COLONOSCOPY: CPT | Performed by: INTERNAL MEDICINE

## 2021-09-13 NOTE — PROCEDURES
COLONOSCOPY REPORT    Urmila Marshall     1955 Age 63year old   PCP Susi Cox MD Endoscopist José Antonio Espana MD     Date of procedure: 21    Location: [address] [place] [address]  [facility])    Procedure: Colonoscopy     Pre- evidence of angioectasias or inflammation. 6. WINNIE: normal rectal tone, no masses palpated. Impression:   · No polyps noted. · Mild sigmoid diverticulosis, internal hemorrhoids. Recommend:  · Repeat CLN in 5 years due to hx of polyps.  If new sig

## 2021-09-16 ENCOUNTER — TELEPHONE (OUTPATIENT)
Dept: GASTROENTEROLOGY | Facility: CLINIC | Age: 66
End: 2021-09-16

## 2021-09-16 NOTE — TELEPHONE ENCOUNTER
Health maintenance updated. Last colonoscopy done 9/13/21. 5 year recall placed into Pt Outreach, next due on 9/13/26 per Dr. Rodo Barnes. Patient received on room air sating 95% this AM. Breath sounds- clear with occasional wheezes this AM. Peak flow attempted but unable to perform. Patient started to sound slightly diminished towards late afternoon and increased WOB.  Patient given a 1 hour

## 2021-10-01 ENCOUNTER — TELEPHONE (OUTPATIENT)
Dept: FAMILY MEDICINE CLINIC | Facility: CLINIC | Age: 66
End: 2021-10-01

## 2021-10-01 DIAGNOSIS — Z23 NEED FOR SHINGLES VACCINE: Primary | ICD-10-CM

## 2021-10-01 NOTE — TELEPHONE ENCOUNTER
Spoke to patient and clarified what vaccine he's requesting. He states he received a varicella injection in May at his last visit. I stated to him that he received shingrix vaccine for shingles not the varicella vaccine. He states he thought they were they same thing. He is looking for an order to get his second varicella vaccine.      Shingrix vaccine has been pended for approval if appropriate

## 2021-10-14 ENCOUNTER — LAB ENCOUNTER (OUTPATIENT)
Dept: LAB | Age: 66
End: 2021-10-14
Attending: FAMILY MEDICINE
Payer: COMMERCIAL

## 2021-10-14 ENCOUNTER — NURSE ONLY (OUTPATIENT)
Dept: FAMILY MEDICINE CLINIC | Facility: CLINIC | Age: 66
End: 2021-10-14
Payer: COMMERCIAL

## 2021-10-14 DIAGNOSIS — T38.0X5A STEROID-INDUCED HYPERGLYCEMIA: ICD-10-CM

## 2021-10-14 DIAGNOSIS — R73.9 STEROID-INDUCED HYPERGLYCEMIA: ICD-10-CM

## 2021-10-14 DIAGNOSIS — Z23 NEED FOR VACCINATION: Primary | ICD-10-CM

## 2021-10-14 PROCEDURE — 83036 HEMOGLOBIN GLYCOSYLATED A1C: CPT

## 2021-10-14 PROCEDURE — 90750 HZV VACC RECOMBINANT IM: CPT | Performed by: FAMILY MEDICINE

## 2021-10-14 PROCEDURE — 90471 IMMUNIZATION ADMIN: CPT | Performed by: FAMILY MEDICINE

## 2021-10-14 PROCEDURE — 36415 COLL VENOUS BLD VENIPUNCTURE: CPT

## 2021-10-14 PROCEDURE — 82947 ASSAY GLUCOSE BLOOD QUANT: CPT

## 2022-01-12 RX ORDER — PRAVASTATIN SODIUM 40 MG
TABLET ORAL
Qty: 90 TABLET | Refills: 1 | Status: SHIPPED | OUTPATIENT
Start: 2022-01-12

## 2022-01-12 NOTE — TELEPHONE ENCOUNTER
Refill passed per [facility], [facility] protocol.   Requested Prescriptions   Pending Prescriptions Disp Refills    PRAVASTATIN 40 MG Oral Tab [Pharmacy Med Name: PRAVASTATIN 40MG TABLETS] 90 tablet 3     Sig: TAKE 1 TABLET(40 MG) BY MOUTH EVERY NIGHT        Chol

## 2022-03-07 ENCOUNTER — OFFICE VISIT (OUTPATIENT)
Dept: OTHER | Facility: HOSPITAL | Age: 67
End: 2022-03-07
Attending: EMERGENCY MEDICINE

## 2022-03-07 DIAGNOSIS — Z00.00 ROUTINE GENERAL MEDICAL EXAMINATION AT A HEALTH CARE FACILITY: Primary | ICD-10-CM

## 2022-03-07 PROCEDURE — 86480 TB TEST CELL IMMUN MEASURE: CPT

## 2022-03-08 LAB
M TB IFN-G CD4+ T-CELLS BLD-ACNC: 0.1 IU/ML
M TB TUBERC IFN-G BLD QL: NEGATIVE
M TB TUBERC IGNF/MITOGEN IGNF CONTROL: >10 IU/ML
QFT TB1 AG MINUS NIL: -0.02 IU/ML
QFT TB2 AG MINUS NIL: 0 IU/ML

## 2022-03-15 NOTE — TELEPHONE ENCOUNTER
Patient is requesting Lisinopril and Pravastatin be refilled to pharmacy [facility] [place] at [address] Wants 90-day supplies of both. Call patient if not able to complete.

## 2022-03-16 RX ORDER — PRAVASTATIN SODIUM 40 MG
TABLET ORAL
Qty: 90 TABLET | Refills: 1 | Status: SHIPPED | OUTPATIENT
Start: 2022-03-16 | End: 2022-03-28

## 2022-03-16 RX ORDER — LISINOPRIL 20 MG/1
20 TABLET ORAL DAILY
Qty: 90 TABLET | Refills: 0 | Status: SHIPPED | OUTPATIENT
Start: 2022-03-16 | End: 2022-03-28

## 2022-03-16 NOTE — TELEPHONE ENCOUNTER
90 day refill given on 03/16/22, appointment needed for further refills--sent patient [employer] message of same    Also routed to [facility] to assist with appt--last seen in office by Dr. Tracy Angeles 6/15/2021    Requested Prescriptions   Pending Prescriptions Disp Refills    lisinopril 20 MG Oral Tab 90 tablet 0     Sig: Take 1 tablet (20 mg total) by mouth daily.         Hypertensive Medications Protocol Failed - 3/15/2022 10:42 AM        Failed - Appointment in past 6 or next 3 months        Passed - CMP or BMP in past 12 months        Passed - GFR Non- > 50     Lab Results   Component Value Date    GFRNAA 86 05/10/2021                   Signed Prescriptions Disp Refills    pravastatin 40 MG Oral Tab 90 tablet 1     Sig: TAKE 1 TABLET(40 MG) BY MOUTH EVERY NIGHT        Cholesterol Medication Protocol Passed - 3/15/2022 10:42 AM        Passed - ALT in past 12 months        Passed - LDL in past 12 months        Passed - Last ALT < 80       Lab Results   Component Value Date    ALT 28 05/10/2021             Passed - Last LDL < 130     Lab Results   Component Value Date     (H) 05/10/2021               Passed - Appointment in past 12 or next 3 months               Recent Outpatient Visits              1 week ago Routine general medical examination at a health care facility    [address] [place] [address]. Visit    5 months ago [facility]    Nurse Only    9 months ago [facility]oid-[facility] hyperglycemia    [facility] Ivis Mcmullen [facility]    Office Visit    10 months ago Routine physical examination    [facility] Ivis Mcmullen MD    Office Visit    1 year ago Routine general medical examination at a health care facility    [address] [place] [address]    Office Visit

## 2022-03-16 NOTE — TELEPHONE ENCOUNTER
Refill passed per [employer], [facility] protocol. Requested Prescriptions   Pending Prescriptions Disp Refills    lisinopril 20 MG Oral Tab 90 tablet 1     Sig: Take 1 tablet (20 mg total) by mouth daily.         Hypertensive Medications Protocol Failed - 3/15/2022 10:42 AM        Failed - Appointment in past 6 or next 3 months        Passed - CMP or BMP in past 12 months        Passed - GFR Non- > 50     Lab Results   Component Value Date    GFRNAA 86 05/10/2021                    pravastatin 40 MG Oral Tab 90 tablet 1     Sig: TAKE 1 TABLET(40 MG) BY MOUTH EVERY NIGHT        Cholesterol Medication Protocol Passed - 3/15/2022 10:42 AM        Passed - ALT in past 12 months        Passed - LDL in past 12 months        Passed - Last ALT < 80       Lab Results   Component Value Date    ALT 28 05/10/2021             Passed - Last LDL < 130     Lab Results   Component Value Date     (H) 05/10/2021               Passed - Appointment in past 12 or next 3 months                Recent Outpatient Visits              1 week ago Routine general medical examination at a health care facility    [address] [place] [address]. Visit    5 months ago Need for vaccination    [facility], [facility], [place]dulia Bai    Nurse Only    9 months ago Steroid-induced hyperglycemia    [facility], [facility], [address], [place], [place]    Office Visit    10 months ago Routine physical examination    [facility], [facility], [address], [place] Rub[place], [place]    Office Visit    1 year ago Routine general medical examination at a health care facility    [address] [place] [address]    Office Visit

## 2022-03-28 ENCOUNTER — TELEPHONE (OUTPATIENT)
Dept: FAMILY MEDICINE CLINIC | Facility: CLINIC | Age: 67
End: 2022-03-28

## 2022-03-28 RX ORDER — PRAVASTATIN SODIUM 40 MG
TABLET ORAL
Qty: 90 TABLET | Refills: 0 | Status: SHIPPED | OUTPATIENT
Start: 2022-03-28

## 2022-03-28 RX ORDER — LISINOPRIL 20 MG/1
20 TABLET ORAL DAILY
Qty: 90 TABLET | Refills: 0 | Status: SHIPPED | OUTPATIENT
Start: 2022-03-28

## 2022-03-28 NOTE — TELEPHONE ENCOUNTER
Patient stated we sent his refill request to the wrong pharmacy. Please send (90 days supply)  lisinopril 20 MG Oral Tab                  Summary: Take 1 tablet (20 mg total) by mouth daily. , Normal, Disp-90 tablet, R-0  90 day refill given on 03/16/22, appointment needed for further refills.         pravastatin 40 MG Oral Tab                  Summary: TAKE 1 TABLET(40 MG) BY MOUTH EVERY NIGHT, Normal, Disp-90 tablet, R-1              To [facility]ACY #[id number]- [place], [place] - [facility], [phone], [phone]

## 2022-06-23 NOTE — TELEPHONE ENCOUNTER
Contact patient to schedule routine physical with labs. Send back for refill if needed prior to visit.

## 2022-06-28 RX ORDER — PRAVASTATIN SODIUM 40 MG
TABLET ORAL
Qty: 90 TABLET | Refills: 0 | OUTPATIENT
Start: 2022-06-28

## 2022-06-28 RX ORDER — LISINOPRIL 20 MG/1
20 TABLET ORAL DAILY
Qty: 90 TABLET | Refills: 0 | OUTPATIENT
Start: 2022-06-28

## 2022-06-28 NOTE — TELEPHONE ENCOUNTER
Letter sent to patient both by [employer] and by [address] [place] [address],[address] mail, notifying him to make appointment in order to receive refills.

## 2022-06-30 ENCOUNTER — TELEPHONE (OUTPATIENT)
Dept: FAMILY MEDICINE CLINIC | Facility: CLINIC | Age: 67
End: 2022-06-30

## 2022-06-30 RX ORDER — PRAVASTATIN SODIUM 40 MG
TABLET ORAL
Qty: 90 TABLET | Refills: 0 | Status: SHIPPED | OUTPATIENT
Start: 2022-06-30

## 2022-06-30 RX ORDER — LISINOPRIL 20 MG/1
20 TABLET ORAL DAILY
Qty: 90 TABLET | Refills: 0 | Status: SHIPPED | OUTPATIENT
Start: 2022-06-30

## 2022-06-30 NOTE — TELEPHONE ENCOUNTER
Per pt received a message via [employer] on 6/23 regarding a refill request for the following medication   lisinopril 20 MG Oral Tab and  pravastatin 40 MG Oral Tab   Pt was told to schedule a physical with Fadi Raya for further medication refills. Pt was scheduled for the next available appointment on 9/8 but pt states he is unable to wait that long for his refills.

## 2022-06-30 NOTE — TELEPHONE ENCOUNTER
As advised below, patient scheduled an appointment. Future Appointments   Date Time Provider Vick Singh   9/8/2022  8:30 AM Shekhar Sanchez  [address]     He states he does not have enough medication to get through 9/8. Scripts pended.

## 2022-09-08 ENCOUNTER — OFFICE VISIT (OUTPATIENT)
Dept: FAMILY MEDICINE CLINIC | Facility: CLINIC | Age: 67
End: 2022-09-08
Payer: COMMERCIAL

## 2022-09-08 ENCOUNTER — LAB ENCOUNTER (OUTPATIENT)
Dept: LAB | Age: 67
End: 2022-09-08
Attending: FAMILY MEDICINE
Payer: COMMERCIAL

## 2022-09-08 VITALS
DIASTOLIC BLOOD PRESSURE: 76 MMHG | RESPIRATION RATE: 19 BRPM | WEIGHT: 198 LBS | BODY MASS INDEX: 26.82 KG/M2 | SYSTOLIC BLOOD PRESSURE: 129 MMHG | HEIGHT: 72 IN | OXYGEN SATURATION: 98 % | HEART RATE: 72 BPM

## 2022-09-08 DIAGNOSIS — Z86.010 HISTORY OF COLON POLYPS: ICD-10-CM

## 2022-09-08 DIAGNOSIS — R73.03 PREDIABETES: ICD-10-CM

## 2022-09-08 DIAGNOSIS — I10 ESSENTIAL HYPERTENSION WITH GOAL BLOOD PRESSURE LESS THAN 140/90: ICD-10-CM

## 2022-09-08 DIAGNOSIS — Z00.00 ROUTINE PHYSICAL EXAMINATION: Primary | ICD-10-CM

## 2022-09-08 PROCEDURE — 90715 TDAP VACCINE 7 YRS/> IM: CPT | Performed by: FAMILY MEDICINE

## 2022-09-08 PROCEDURE — 90471 IMMUNIZATION ADMIN: CPT | Performed by: FAMILY MEDICINE

## 2022-09-08 PROCEDURE — 99396 PREV VISIT EST AGE 40-64: CPT | Performed by: FAMILY MEDICINE

## 2022-09-08 PROCEDURE — 3074F SYST BP LT 130 MM HG: CPT | Performed by: FAMILY MEDICINE

## 2022-09-08 PROCEDURE — 3008F BODY MASS INDEX DOCD: CPT | Performed by: FAMILY MEDICINE

## 2022-09-08 PROCEDURE — 3078F DIAST BP <80 MM HG: CPT | Performed by: FAMILY MEDICINE

## 2022-09-08 RX ORDER — PRAVASTATIN SODIUM 40 MG
TABLET ORAL
Qty: 90 TABLET | Refills: 3 | Status: SHIPPED | OUTPATIENT
Start: 2022-09-08

## 2022-09-08 RX ORDER — BUDESONIDE 0.25 MG/2ML
INHALANT ORAL
COMMUNITY
Start: 2021-04-20

## 2022-09-08 RX ORDER — LISINOPRIL 20 MG/1
20 TABLET ORAL DAILY
Qty: 90 TABLET | Refills: 3 | Status: SHIPPED | OUTPATIENT
Start: 2022-09-08

## 2022-09-09 LAB
ALBUMIN/GLOBULIN RATIO: 1.9 (CALC) (ref 1–2.5)
ALBUMIN: 4.3 G/DL (ref 3.6–5.1)
ALKALINE PHOSPHATASE: 82 U/L (ref 35–144)
ALT: 20 U/L (ref 9–46)
AST: 23 U/L (ref 10–35)
BILIRUBIN, TOTAL: 0.5 MG/DL (ref 0.2–1.2)
BUN: 21 MG/DL (ref 7–25)
CALCIUM: 9.5 MG/DL (ref 8.6–10.3)
CARBON DIOXIDE: 28 MMOL/L (ref 20–32)
CHLORIDE: 102 MMOL/L (ref 98–110)
CHOL/HDLC RATIO: 2.9 (CALC)
CHOLESTEROL, TOTAL: 141 MG/DL
CREATININE: 0.93 MG/DL (ref 0.7–1.35)
EGFR: 92 ML/MIN/1.73M2
GLOBULIN: 2.3 G/DL (CALC) (ref 1.9–3.7)
GLUCOSE: 108 MG/DL (ref 65–99)
HDL CHOLESTEROL: 49 MG/DL
HEMOGLOBIN A1C: 5.8 % OF TOTAL HGB
LDL-CHOLESTEROL: 77 MG/DL (CALC)
NON-HDL CHOLESTEROL: 92 MG/DL (CALC)
POTASSIUM: 4.2 MMOL/L (ref 3.5–5.3)
PROTEIN, TOTAL: 6.6 G/DL (ref 6.1–8.1)
PSA, TOTAL: 1.76 NG/ML
SODIUM: 137 MMOL/L (ref 135–146)
TRIGLYCERIDES: 66 MG/DL

## 2023-09-22 DIAGNOSIS — J01.80 OTHER ACUTE SINUSITIS: ICD-10-CM

## 2023-09-27 RX ORDER — LISINOPRIL 20 MG/1
20 TABLET ORAL DAILY
Qty: 90 TABLET | Refills: 0 | Status: SHIPPED | OUTPATIENT
Start: 2023-09-27

## 2023-09-27 NOTE — TELEPHONE ENCOUNTER
Please review refill protocol failed/ no protocol  Requested Prescriptions   Pending Prescriptions Disp Refills    LISINOPRIL 20 MG Oral Tab [Pharmacy Med Name: LISINOPRIL 20 MG TABLET] 90 tablet 3     Sig: Take 1 tablet (20 mg total) by mouth daily. Routine exam due with Dr. Ovidio Shen on or after 5/10/22 for further refills       Hypertensive Medications Protocol Failed - 9/22/2023 12:27 AM        Failed - In person appointment in the past 12 or next 3 months     Recent Outpatient Visits              1 year ago Routine physical examination    [address] [place] [address], [address] [place] [address] MD Rigoberto    Office Visit    1 year ago Routine general medical examination at a health care facility    [address] [place] [address]. Visit    1 year ago Need for vaccination    [address]    Nurse Only    2 years ago Steroid-induced hyperglycemia    [address] [place] [address] Clarisa Marie MD    Office Visit    2 years ago Routine physical examination    [address], [address] [place] [address] MD Rigoberto    Office Visit          Future Appointments         Provider Department Appt Notes    In 3 months Clarisa Marie MD [address], [facility], [facility] px pt last px was 09/08/2022                    Failed - CMP or BMP in past 6 months     No results found for this or any previous visit (from the past 4392 hour(s)).             Failed - In person appointment or virtual visit in the past 6 months     Recent Outpatient Visits              1 year ago Routine physical examination    Pancho Heredia MD    Office Visit    1 year ago Routine general medical examination at a health care facility    [address] [place] [address]. Visit    1 year ago Need for vaccination    [address], [facility], [facility] Nurse Only    2 years ago Steroid-induced hyperglycemia    [facility], [address], [facility] Ricardo Schmitz MD    Office Visit    2 years ago Routine physical examination    [address] [facility], [facility] Ricardo Schmitz MD    Office Visit          Future Appointments         Provider Department Appt Notes    In 3 months Ricardo Schmitz MD [address] [facility], [facility] px pt last px was 09/08/2022                    Failed - EGFRCR or GFRNAA > 50     GFR Evaluation            Passed - Last BP reading less than 140/90     BP Readings from Last 1 Encounters:  09/08/22 : 129/76

## 2023-10-20 RX ORDER — PRAVASTATIN SODIUM 40 MG
TABLET ORAL
Qty: 90 TABLET | Refills: 0 | Status: SHIPPED | OUTPATIENT
Start: 2023-10-20

## 2023-10-20 NOTE — TELEPHONE ENCOUNTER
Please review. Protocol failed / No protocol.     Requested Prescriptions   Pending Prescriptions Disp Refills    PRAVASTATIN 40 MG Oral Tab [Pharmacy Med Name: PRAVASTATIN SODIUM 40 MG TAB] 90 tablet 3     Sig: TAKE 1 TABLET(40 MG) BY MOUTH EVERY NIGHT; Routine medical exam needed with Dr. Marta Tolentino on or after 5/10/22 for further refills       Cholesterol Medication Protocol Failed - 10/19/2023 10:24 AM        Failed - ALT in past 12 months        Failed - LDL in past 12 months        Failed - Last ALT < 80     Lab Results   Component Value Date    ALT 20 09/08/2022             Failed - Last LDL < 130     Lab Results   Component Value Date    LDL 77 09/08/2022             Passed - In person appointment or virtual visit in the past 12 mos or appointment in next 3 mos     Recent Outpatient Visits              1 year ago [facility]    [facility]    1 year ago Routine general medical examination at a health care facility    [address] [place] [address]. Visit    2 years ago Need for vaccination    [address] , [facility]    Nurse Only    2 years ago Steroid-induced hyperglycemia    [address] [facility] Joseph gutiérrez MD    Office Visit    2 years ago Routine physical examination    [address], [facility] Quita Millard MD    Office Visit          Future Appointments         Provider Department Appt Notes    In 2 months MD Syl Jeong [address] , [facility] px pt last px was 09/08/2022                           Recent Outpatient Visits              1 year ago Routine physical examination    Varun Chilel MD    Office Visit    1 year ago Routine general medical examination at a health care facility    [address] [place] [address] Health    Office Visit    2 years ago Need for vaccination    Karri , [facility]    Nurse Only    2 years ago Steroid-induced hyperglycemia    [address] [place], [facility] Cherelle Velasco MD    Office Visit    2 years ago Routine physical examination    [address], [address], [facility] Cherelle Velasco MD    Office Visit            Future Appointments         Provider Department Appt Notes    In 2 months MD Karri Louie , [facility] px pt last px was 2022

## 2023-10-31 ENCOUNTER — APPOINTMENT (OUTPATIENT)
Dept: OTHER | Facility: HOSPITAL | Age: 68
End: 2023-10-31
Attending: FAMILY MEDICINE

## 2023-12-25 DIAGNOSIS — J01.80 OTHER ACUTE SINUSITIS: ICD-10-CM

## 2023-12-27 RX ORDER — LISINOPRIL 20 MG/1
20 TABLET ORAL DAILY
Qty: 90 TABLET | Refills: 0 | Status: SHIPPED | OUTPATIENT
Start: 2023-12-27

## 2023-12-27 NOTE — TELEPHONE ENCOUNTER
Please review. Protocol failed / No Protocol. Future Appointments   Date Time Provider Vick Singh   1/2/2024  6:00 PM Abel Majano MD [facility]         Requested Prescriptions   Pending Prescriptions Disp Refills    LISINOPRIL 20 MG Oral Tab [Pharmacy Med Name: LISINOPRIL 20 MG TABLET] 90 tablet 0     Sig: Take 1 tablet (20 mg total) by mouth daily. Routine exam due with Dr. Johanna Watson on or after 5/10/22 for further refills       Hypertensive Medications Protocol Failed - 12/25/2023 10:30 AM        Failed - CMP or BMP in past 6 months     No results found for this or any previous visit (from the past 4392 hour(s)).             Failed - In person appointment or virtual visit in the past 6 months     Recent Outpatient Visits              1 year ago Routine physical examination    [facility] Abel Majano MD    Office Visit    1 year ago Routine general medical examination at a health care facility    [address] [place] [address]. Visit    2 years ago Need for vaccination    [address], [facility]    Nurse Only    2 years ago Steroid-induced hyperglycemia    [facility] Abel Majano MD    Office Visit    2 years ago Routine physical examination    [address], [facility], [facility] Abel Majano MD    Office Visit          Future Appointments         Provider Department Appt Notes    In 1 week Abel Majano MD [address], [facility], [facility] px pt last px was 09/08/2022               Failed - EGFRCR or GFRNAA > 50     GFR Evaluation            Passed - In person appointment in the past 12 or next 3 months     Recent Outpatient Visits              1 year ago Routine physical examination    Yao Coyne MD    Office Visit    1 year ago Routine general medical examination at a health care facility    [address] [place] [address]. Visit    2 years ago Need for vaccination    [address], [address], [place] [place]    Nurse Only    2 years ago Steroid-induced hyperglycemia    [address] [facility]    Office Visit    2 years ago Routine physical examination    [address], [address], [place] Kwok MD    Office Visit          Future Appointments         Provider Department Appt Notes    In 1 week Tracie Kwok MD [address] [place] px pt last px was 09/08/2022               Passed - Last BP reading less than 140/90     BP Readings from Last 1 Encounters:   09/08/22 129/76

## 2024-01-02 ENCOUNTER — OFFICE VISIT (OUTPATIENT)
Dept: FAMILY MEDICINE CLINIC | Facility: CLINIC | Age: 69
End: 2024-01-02

## 2024-01-02 VITALS
SYSTOLIC BLOOD PRESSURE: 133 MMHG | BODY MASS INDEX: 28.63 KG/M2 | OXYGEN SATURATION: 94 % | HEIGHT: 72 IN | HEART RATE: 81 BPM | WEIGHT: 211.38 LBS | DIASTOLIC BLOOD PRESSURE: 76 MMHG

## 2024-01-02 DIAGNOSIS — M77.12 LATERAL EPICONDYLITIS OF LEFT ELBOW: ICD-10-CM

## 2024-01-02 DIAGNOSIS — Z00.00 ROUTINE PHYSICAL EXAMINATION: Primary | ICD-10-CM

## 2024-01-02 DIAGNOSIS — J01.80 OTHER ACUTE SINUSITIS, RECURRENCE NOT SPECIFIED: ICD-10-CM

## 2024-01-02 DIAGNOSIS — Z13.6 ENCOUNTER FOR ABDOMINAL AORTIC ANEURYSM SCREENING: ICD-10-CM

## 2024-01-02 DIAGNOSIS — Z12.5 SCREENING FOR PROSTATE CANCER: ICD-10-CM

## 2024-01-02 DIAGNOSIS — I10 ESSENTIAL HYPERTENSION WITH GOAL BLOOD PRESSURE LESS THAN 140/90: ICD-10-CM

## 2024-01-02 PROCEDURE — 3078F DIAST BP <80 MM HG: CPT | Performed by: FAMILY MEDICINE

## 2024-01-02 PROCEDURE — 3075F SYST BP GE 130 - 139MM HG: CPT | Performed by: FAMILY MEDICINE

## 2024-01-02 PROCEDURE — 99397 PER PM REEVAL EST PAT 65+ YR: CPT | Performed by: FAMILY MEDICINE

## 2024-01-02 PROCEDURE — 90677 PCV20 VACCINE IM: CPT | Performed by: FAMILY MEDICINE

## 2024-01-02 PROCEDURE — 3008F BODY MASS INDEX DOCD: CPT | Performed by: FAMILY MEDICINE

## 2024-01-02 PROCEDURE — 90471 IMMUNIZATION ADMIN: CPT | Performed by: FAMILY MEDICINE

## 2024-01-02 RX ORDER — LISINOPRIL AND HYDROCHLOROTHIAZIDE 20; 12.5 MG/1; MG/1
1 TABLET ORAL DAILY
Qty: 90 TABLET | Refills: 1 | Status: SHIPPED | OUTPATIENT
Start: 2024-01-02

## 2024-01-02 RX ORDER — AMOXICILLIN AND CLAVULANATE POTASSIUM 875; 125 MG/1; MG/1
1 TABLET, FILM COATED ORAL 2 TIMES DAILY
Qty: 20 TABLET | Refills: 0 | Status: SHIPPED | OUTPATIENT
Start: 2024-01-02 | End: 2024-01-12

## 2024-01-03 NOTE — PROGRESS NOTES
Subjective:   Patient ID: Nolan Keenan is a 68 year old male.    Patient presents for routine physical and issues as below.        History/Other:   Review of Systems   Constitutional: Negative.    HENT:  Positive for congestion.    Respiratory: Negative.     Cardiovascular: Negative.    Gastrointestinal: Negative.    Musculoskeletal:         See below   Skin: Negative.    Neurological: Negative.      Current Outpatient Medications   Medication Sig Dispense Refill    amoxicillin clavulanate 875-125 MG Oral Tab Take 1 tablet by mouth 2 (two) times daily for 10 days. 20 tablet 0    lisinopril-hydroCHLOROthiazide 20-12.5 MG Oral Tab Take 1 tablet by mouth daily. 90 tablet 1    pravastatin 40 MG Oral Tab TAKE 1 TABLET(40 MG) BY MOUTH EVERY NIGHT; Routine medical exam needed with Dr. Maciel on or after 5/10/22 for further refills 90 tablet 0    budesonide 0.25 MG/2ML Inhalation Suspension USE THE CONTENTS OF 1 CAPSULE IN IRRIGATION DEVICE IN THE MORNING AND 1 CAPSULE IN THE EVENING. MIX WITH 1 CUP OF IRRIGATION SOLUTION. IRRIG      terbinafine 1 % External Cream Apply 1 Application topically 2 (two) times daily. (Patient not taking: Reported on 9/8/2022) 42 g 1     Allergies:No Known Allergies    Objective:   Physical Exam  Constitutional:       Appearance: Normal appearance.   Cardiovascular:      Rate and Rhythm: Normal rate and regular rhythm.      Heart sounds: Normal heart sounds.   Pulmonary:      Effort: Pulmonary effort is normal.      Breath sounds: Normal breath sounds.   Abdominal:      Palpations: Abdomen is soft. There is no mass.      Tenderness: There is no abdominal tenderness.   Musculoskeletal:      Comments: Point tenderness lateral epicondyles left arm   Lymphadenopathy:      Cervical: No cervical adenopathy.   Skin:     General: Skin is warm and dry.   Neurological:      Mental Status: He is alert and oriented to person, place, and time.         Assessment & Plan:   1. 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  Exercising regularly with walking.  Discussed concern of a aortic ectasia on 2013 CT heart scan.  Plan to repeat CT heart scan at this time.  Discussed weight gain since last visit.  Recommend stop eating after 7 PM  PCV 20 given today  Will return for fasting labs.     2. Screening for prostate cancer-PSA ordered today   3. Other acute sinusitis, recurrence not specified-developed congestion, cough, GI upset 6 days ago.  Now having some purulent postnasal drip.  History of sinus surgery in the past.  If not improved in 3 to 4 days recommend fill Rx for Augmentin.   4. Essential hypertension with goal blood pressure less than 130/80-plan to change lisinopril to lisinopril/HCT reviewed instructions and side effects.   5. Encounter for abdominal aortic aneurysm screening-history of smoking just a few cigarettes.  Recommend AAA screen   6. Lateral epicondylitis of left elbow-pain started after catching a falling ladder with left arm.  Has improved but still some discomfort over lateral epicondyles.  Recommend avoid repetitive movement, heavy lifting.  Ice 3 times daily.       Orders Placed This Encounter   Procedures    PSA, TOTAL [5363] [Q]    Comp Metabolic Panel (14)    Lipid Panel    CBC, Platelet; No Differential    PCV20 VACCINE FOR INTRAMUSCULAR USE       Meds This Visit:  Requested Prescriptions     Signed Prescriptions Disp Refills    amoxicillin clavulanate 875-125 MG Oral Tab 20 tablet 0     Sig: Take 1 tablet by mouth 2 (two) times daily for 10 days.    lisinopril-hydroCHLOROthiazide 20-12.5 MG Oral Tab 90 tablet 1     Sig: Take 1 tablet by mouth daily.       Imaging & Referrals:  PCV20 VACCINE FOR INTRAMUSCULAR USE  US ABDOMINAL AORTIC ANEURYSM SCREENING (CPT=76706)  CT CALCIUM SCORING

## 2024-02-22 ENCOUNTER — HOSPITAL ENCOUNTER (OUTPATIENT)
Dept: CT IMAGING | Age: 69
Discharge: HOME | End: 2024-02-22
Attending: FAMILY MEDICINE

## 2024-02-22 DIAGNOSIS — I10 ESSENTIAL HYPERTENSION WITH GOAL BLOOD PRESSURE LESS THAN 140/90: ICD-10-CM

## 2024-02-22 LAB
POCT GLUCOSE CHOLESTECH: 115 (ref 70–99)
POCT HDL: 48 (ref 45–70)
POCT LDL: 169 (ref 0–99)
POCT TOTAL CHOLESTEROL: 234 (ref 110–200)
POCT TRIGLYCERIDES: 83 (ref 1–149)

## 2024-02-22 NOTE — PROGRESS NOTES
Patient is scheduled for the abdominal aorta screening in [place] on February 27 at 7:30am.  I  saw the patient for the Heart Scan today and recommended that patient should have the Carotid Screening.  I checked with the Lead Nicole gilbert, Jennifer, and the Carotid Artery screening is being added to his abdominal aorta screening, on February 27.

## 2024-02-22 NOTE — PROGRESS NOTES
Date of Service 2/22/2024    ABRAHAM HAND  Date of Birth 9/26/1955    Patient Age: 68 year old    PCP: Susi Maciel MD  [address] [place] [address]  [address]  [facility] [address]    Heart Scan Consult  Preliminary Heart Scan Score: 54.3    Previous Screening                      Risk Factors  Personal Risk Factors  Alterable Risk Factors: High Blood Pressure;Abnormal Cholesterol      Body Mass Index  BMI 29    Blood Pressure  /76 on med.  (Normal =< 120/80,  Elevated = 120-129/ >80,  High Stage1 130-139/80-89 , Stage2 >140/>90)    Lipid Profile  Patient was in fasting state: Yes    Cholesterol: 234, done on 2/22/2024.  HDL Cholesterol: 48, done on 2/22/2024.  LDL Cholesterol: 169, done on 2/22/2024.  TriGlycerides 83, done on 2/22/2024.    His insurance changed and now needs to change his pharmacy and has not done this yet.  He has been off his statin for about 2 months now.    Cholesterol Goals  Value   Total  =< 200   HDL  = > 45 Men = > 55 Women   LDL   =< 100   Triglycerides  =< 150       Glucose and Hemoglobin A1C  Lab Results   Component Value Date     (H) 09/08/2022    A1C 5.8 (H) 09/08/2022     (Normal Fasting Glucose < 100mg/dl )    Nurse Review  Risk factor information and results reviewed with Nurse: Yes    Recommended Follow Up:  Consult your physician regarding:: Final Heart Scan Report;Discuss potential for Incidental Finding      Recommendations for Change:  Nutrition Changes: Low Saturated Fat (Go back on your cholesterol medication as prescribed.)    Cholesterol Modification (goal of therapy depends upon your risk): Decrease LDL (Lousy/Bad) Ideal <100    Exercise: Enhance Current Program    Smoking Cessation: No Change Needed    Weight Management: Decrease Current Weight    Stress Management: Adopt Stress Management Techniques    Repeat Heart Scan: 3 Years if Calcium Score is > 0.0;Discuss with your Physician              [place] Recommended Resources:  Recommended Resources: Upcoming  Classes, Medical Services and Health Library [email or web address]            Alba MATHEWS RN        Please Contact the Nurse Heart Line with any Questions or Concerns [phone].

## 2024-02-22 NOTE — ADDENDUM NOTE
Encounter addended by: Alba Plascencia RN on: 2/22/2024 11:14 AM   Actions taken: Clinical Note Signed

## 2024-02-26 DIAGNOSIS — R73.9 HYPERGLYCEMIA: Primary | ICD-10-CM

## 2024-02-27 ENCOUNTER — HOSPITAL ENCOUNTER (OUTPATIENT)
Dept: ULTRASOUND IMAGING | Age: 69
Discharge: HOME | End: 2024-02-27
Attending: FAMILY MEDICINE

## 2024-02-27 DIAGNOSIS — Z13.6 ENCOUNTER FOR ABDOMINAL AORTIC ANEURYSM SCREENING: ICD-10-CM

## 2024-02-27 DIAGNOSIS — Z13.6 ENCOUNTER FOR SCREENING FOR STENOSIS OF CAROTID ARTERY: ICD-10-CM

## 2024-02-27 NOTE — TELEPHONE ENCOUNTER
·  lisinopril-hydroCHLOROthiazide 20-12.5 MG Oral Tab, Take 1 tablet by mouth daily., Disp: 90 tablet, Rfl: 1  ·  pravastatin 40 MG Oral Tab, TAKE 1 TABLET(40 MG) BY MOUTH EVERY NIGHT;

## 2024-02-28 ENCOUNTER — PATIENT MESSAGE (OUTPATIENT)
Dept: FAMILY MEDICINE CLINIC | Facility: CLINIC | Age: 69
End: 2024-02-28

## 2024-02-28 RX ORDER — PRAVASTATIN SODIUM 40 MG
TABLET ORAL
Qty: 90 TABLET | Refills: 0 | OUTPATIENT
Start: 2024-02-28

## 2024-02-28 RX ORDER — PRAVASTATIN SODIUM 40 MG
TABLET ORAL
Qty: 90 TABLET | Refills: 3 | Status: SHIPPED | OUTPATIENT
Start: 2024-02-28

## 2024-02-28 RX ORDER — LISINOPRIL AND HYDROCHLOROTHIAZIDE 20; 12.5 MG/1; MG/1
1 TABLET ORAL DAILY
Qty: 90 TABLET | Refills: 1 | Status: SHIPPED | OUTPATIENT
Start: 2024-02-28

## 2024-02-28 RX ORDER — LISINOPRIL AND HYDROCHLOROTHIAZIDE 20; 12.5 MG/1; MG/1
1 TABLET ORAL DAILY
Qty: 90 TABLET | Refills: 3 | Status: SHIPPED | OUTPATIENT
Start: 2024-02-28

## 2024-02-28 RX ORDER — PRAVASTATIN SODIUM 40 MG
TABLET ORAL
Qty: 90 TABLET | Refills: 0 | Status: SHIPPED | OUTPATIENT
Start: 2024-02-28

## 2024-02-28 NOTE — TELEPHONE ENCOUNTER
Please review.  Protocol failed / No protocol.    Requested Prescriptions   Pending Prescriptions Disp Refills    lisinopril-hydroCHLOROthiazide 20-12.5 MG Oral Tab 90 tablet 3     Sig: Take 1 tablet by mouth daily.       Hypertension Medications Protocol Failed - 2/27/2024 11:37 AM        Failed - CMP or BMP in past 12 months        Failed - EGFRCR or GFRNAA > 50     GFR Evaluation            Passed - Last BP reading less than 140/90     BP Readings from Last 1 Encounters:   01/02/24 133/76               Passed - In person appointment or virtual visit in the past 12 mos or appointment in next 3 mos     Recent Outpatient Visits              1 month ago Routine physical examination    [facility] Susi Maciel MD    Office Visit    1 year ago Routine physical examination    [facility] Susi Maciel MD    Office Visit    1 year ago Routine general medical examination at a health care facility    [facility]    Office Visit    2 years ago Need for vaccination    [facility]    Nurse Only    2 years ago Steroid-induced hyperglycemia    [facility]    Office Visit                        pravastatin 40 MG Oral Tab 90 tablet 3     Sig: TAKE 1 TABLET(40 MG) BY MOUTH EVERY NIGHT;       Cholesterol Medication Protocol Failed - 2/27/2024 11:37 AM        Failed - ALT < 80     Lab Results   Component Value Date    ALT 20 09/08/2022             Failed - ALT resulted within past year        Failed - Lipid panel within past 12 months     Lab Results   Component Value Date    CHOLEST 234 (A) 02/22/2024    TRIG 83 02/22/2024    HDL 48 02/22/2024     (A) 02/22/2024    VLDL 11 05/10/2021    TCHDLRATIO 2.9 09/08/2022    NONHDLC 92 09/08/2022             Passed - In person appointment or virtual visit in the past 12 mos  or appointment in next 3 mos     Recent Outpatient Visits              1 month ago Routine physical examination    [facility] [place], Mary C, MD    Office Visit    1 year ago Routine physical examination    [facility] [address], [place] Janell, Susi C, MD    Office Visit    1 year ago Routine general medical examination at a health care facility    [facility]    Office Visit    2 years ago Need for vaccination    [facility] [address], [place]    Nurse Only    2 years ago Steroid-induced hyperglycemia    [facility] [place], Mary C, MD    Office Visit                               Recent Outpatient Visits              1 month ago Routine physical examination    [facility], Mary C, MD    Office Visit    1 year ago Routine physical examination    [facility] [place], Mary C, MD    Office Visit    1 year ago Routine general medical examination at a health care facility    [facility]    Office Visit    2 years ago Need for vaccination    [facility] [address], [place]    Nurse Only    2 years ago Steroid-induced hyperglycemia    [facility] [place], Mary C, MD    Office Visit

## 2024-02-28 NOTE — TELEPHONE ENCOUNTER
Please contact patient to make appointment to follow-up on hypertension on new medication and discuss recent vascular screenings.  Bring list of home blood pressures if available.

## 2024-02-28 NOTE — TELEPHONE ENCOUNTER
From: Nolan Keenan  To: Susi Maciel  Sent: 2/28/2024 9:11 AM CST  Subject: Script redirected to [facility] Dr. Maciel. Please send that script for the lisinopril / hctz (the med you prescribed at the 1/2/24 visit) to the [facility] at [address] [place] [address] in [place]. I forgot that with the change back to [facility] that [facility] no longer my pharmacy. Also need refill on pravastatin sent to same [facility]. Thanks. My high cholesterol #s probably explained by not taking pravastatin. Obviously shows med is effective. Thanks again for your help

## 2024-03-01 NOTE — ADDENDUM NOTE
Addended by: Fariba Sexton on: 9/8/2022 09:13 AM     Modules accepted: Orders
no discharge, no irritation, no pain, no redness, and no visual changes.

## 2024-03-29 ENCOUNTER — TELEPHONE (OUTPATIENT)
Dept: FAMILY MEDICINE CLINIC | Facility: CLINIC | Age: 69
End: 2024-03-29

## 2024-03-29 NOTE — TELEPHONE ENCOUNTER
Message  Received: Yesterday  Susi Maciel MD  P Em Triage Support  Please remind patient about scheduling aortic aneurysm screening.          Previous Messages    Nolan Keenan   (MRN [id number])  Order Barcode    Click to print Order Cover Sheet for scanning        Reprint Original Order    US ABDOMINAL AORTIC ANEURYSM SCREENING (CPT=76706) (Order #[id number]) on 2/27/24      US ABDOMINAL AORTIC ANEURYSM SCREENING (CPT=76706) ([id number]) [[id number]] (Order [id number])  Imaging  Date: 2/27/2024 Department: [facility] Released By: Marisa Luna Authorizing: Susi Maciel MD     Order Information    Date Department Released By Authorizing   2/27/2024 [facility] Marisa Luna Mary C, MD      Order Information    Order Date/Time Release Date/Time Start Date/Time End Date/Time   02/27/24 07:19 AM 02/27/24 07:19 AM 02/27/24 07:19 AM 2/27/2024      Order Details    Frequency Duration Priority Order Class   None 1  occurrence Routine EHV - RFL      Associated Diagnoses    Encounter for abdominal aortic aneurysm screening         Diagnoses     Codes Comments   Encounter for abdominal aortic aneurysm screening Z13.6           Reason for Exam  Priority: Routine  Dx: Encounter for abdominal aortic aneurysm screening [Z13.6 (ICD-10-CM)]      Order History  Inpatient  Date/Time Action Taken User Additional Information   02/27/24 0719 Release Marisa Luna From Order:[id number]      Collection Information         Order Provider Info        Office phone Pager E-mail   Ordering User  Susi Maciel MD  [phone] -- --   Authorizing Provider  Susi Maciel MD  [phone] -- --   Attending Provider When Ordered  Susi Maciel MD  [phone] -- --      Additional Information    Associated Reports External References   View Encounter [facility] Ordering Protocol      Audit Albin    Super Audit Albin

## 2024-03-29 NOTE — TELEPHONE ENCOUNTER
Spoke with patient.  He states he had the exam done 02/27.  Spoke with Nara in [facility] who states it is resulted under cardiology.  Order was changed to screening exam of carotids and abdominal aorta (states they spoke with someone in the office about the order, no documentation noted.  Results pasted below:      TECHNIQUE:   An ultrasound screening study was performed to evaluate the internal carotid arteries and abdominal aorta.       Impression  CONCLUSION:      INTERNAL CAROTID ARTERIES:       Right ICA = 2 Left ICA = 2     1= Normal (No plaque, ICA PSV <150 cm/s)  2= Mild   (Plaque present, ICA PSV <150 cm/s)  3= Moderate   (ICA PSV between 150-225 cm/s)  4= Severe   (ICA PSV >225)     ABDOMINAL AORTA: 1     1= Less than 3 cm- Normal  2= More than 3 cm-Abnormal  3= Not visible        INCIDENTAL FINDINGS:   None.

## 2024-10-22 ENCOUNTER — APPOINTMENT (OUTPATIENT)
Dept: OTHER | Facility: HOSPITAL | Age: 69
End: 2024-10-22
Attending: EMERGENCY MEDICINE

## 2025-01-30 ENCOUNTER — OFFICE VISIT (OUTPATIENT)
Dept: FAMILY MEDICINE CLINIC | Facility: CLINIC | Age: 70
End: 2025-01-30

## 2025-01-30 ENCOUNTER — TELEPHONE (OUTPATIENT)
Dept: FAMILY MEDICINE CLINIC | Facility: CLINIC | Age: 70
End: 2025-01-30

## 2025-01-30 VITALS
BODY MASS INDEX: 28 KG/M2 | RESPIRATION RATE: 18 BRPM | TEMPERATURE: 98 F | WEIGHT: 205 LBS | DIASTOLIC BLOOD PRESSURE: 67 MMHG | HEART RATE: 68 BPM | SYSTOLIC BLOOD PRESSURE: 110 MMHG

## 2025-01-30 DIAGNOSIS — R68.84 JAW PAIN: Primary | ICD-10-CM

## 2025-01-30 PROCEDURE — 3078F DIAST BP <80 MM HG: CPT | Performed by: FAMILY MEDICINE

## 2025-01-30 PROCEDURE — 99213 OFFICE O/P EST LOW 20 MIN: CPT | Performed by: FAMILY MEDICINE

## 2025-01-30 PROCEDURE — 3074F SYST BP LT 130 MM HG: CPT | Performed by: FAMILY MEDICINE

## 2025-01-30 NOTE — TELEPHONE ENCOUNTER
Condition update:  Patient called (identified name and ),   States he has been having pain in right jaw, only when he eats.  Onset 3 days ago.  Went to dentist 2 days ago who did xray which was normal.  Does not seem to be a TMJ problem.  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  No palpable swelling noted.  Patient requesting appointment.  Scheduled:  Future Appointments   Date Time Provider Department Center   2025  5:00 PM Tash Luis MD [facility]

## 2025-02-01 NOTE — PROGRESS NOTES
Nolan Keenan is a 69 year old male.  Chief Complaint   Patient presents with    Jaw Pain     Pt reports intense pain in right lower jaw when he eats. Comes and goes, length of pain ranges from right after eating to an hour after. C/o of salivary gland tumor.       HPI:   Patient is a 69-year-old male presents with intense right lower jaw pain about an hour after eating.  He is worried it is a salivary gland tumor.  No swelling in that area.  It is well below TMJ    Current Outpatient Medications   Medication Sig Dispense Refill    lisinopril-hydroCHLOROthiazide 20-12.5 MG Oral Tab Take 1 tablet by mouth daily. 90 tablet 3    pravastatin 40 MG Oral Tab TAKE 1 TABLET(40 MG) BY MOUTH EVERY NIGHT; 90 tablet 3    lisinopril-hydroCHLOROthiazide 20-12.5 MG Oral Tab Take 1 tablet by mouth daily. 90 tablet 1    pravastatin 40 MG Oral Tab TAKE 1 TABLET(40 MG) BY MOUTH EVERY NIGHT; Routine medical exam needed with Dr. Maciel on or after 5/10/22 for further refills 90 tablet 0      Past Medical History:    Blunt trauma of forehead    Ran into a pole and hit right above the [LEFT] brow (ophthalmology).    Hyperlipidemia    Nasal polyps    polypectomy    Optic cupping of both eyes    increased C/D ratio OU    Traumatic scrotal hematoma    surgical drainage    Tubular adenoma of colon    None in 2021. repeat CLN in 2026    Unspecified essential hypertension      Past Surgical History:   Procedure Laterality Date    Anesth,nose,radical sinus surgery  1990    Colonoscopy & polypectomy  2007    colonic polyps, tubular adenoma    Drainage of hematoma/fluid  1971    surgical drainage, scrotal traumatic hematoma    Electrocardiogram, complete  10/12/2012    scanned to media tab    Nasal scope,bx/rmv polyp/debrid      polypectomy, nasal polyps      Social History:  Social History     Socioeconomic History    Marital status:    Tobacco Use    Smoking status: Never    Smokeless tobacco: Never   Vaping Use    Vaping status: Never  Used   Substance and Sexual Activity    Alcohol use: Yes     Comment: beer & wine, 2 glasses, rarely    Drug use: Not Currently   Other Topics Concern    Caffeine Concern Yes     Comment: coffee, 4 cups daily    Pt has a pacemaker No    Pt has a defibrillator No    Reaction to local anesthetic No     Social Drivers of Health      [facility]    Social Connections    Received from [facility], [facility]    Housing Stability        REVIEW OF SYSTEMS:   GENERAL HEALTH: No fevers, chills, sweats, fatigue  VISION: No recent vision problems, blurry vision or double vision  HEENT: No decreased hearing ear pain nasal congestion or sore throat  SKIN: denies any unusual skin lesions or rashes  RESPIRATORY: denies shortness of breath, cough, wheezing  CARDIOVASCULAR: denies chest pain on exertion, palpitations, swelling in feet  GI: denies abdominal pain and denies heartburn, nausea or vomiting  : No Pain on urination, change in the color of urine, discharge, urinating frequently  MUS: No back pain, joint pain, muscle pain  NEURO: denies headaches , anxiety, depression    EXAM:   /67 (BP Location: Left arm, Patient Position: Sitting, Cuff Size: adult)   Pulse 68   Temp 97.8 °F (36.6 °C) (Temporal)   Resp 18   Wt 205 lb (93 kg)   BMI 27.80 kg/m²   GENERAL: well developed, well nourished,in no apparent distress        ASSESSMENT AND PLAN:   1. Jaw pain  Referral given to oral surgery.  Does not seem to be TMJ arthritis.  No swelling of salivary  - Oral Surgery Referral - In Network       The patient indicates understanding of these issues and agrees to the plan.  No follow-ups on file.

## 2025-02-03 ENCOUNTER — OFFICE VISIT (OUTPATIENT)
Dept: OTHER | Facility: HOSPITAL | Age: 70
End: 2025-02-03
Attending: EMERGENCY MEDICINE

## 2025-02-03 DIAGNOSIS — Z00.00 ROUTINE GENERAL MEDICAL EXAMINATION AT A HEALTH CARE FACILITY: Primary | ICD-10-CM

## 2025-02-03 PROCEDURE — 86480 TB TEST CELL IMMUN MEASURE: CPT

## 2025-02-06 LAB
M TB IFN-G CD4+ T-CELLS BLD-ACNC: 0.22 IU/ML
M TB TUBERC IFN-G BLD QL: NEGATIVE
M TB TUBERC IGNF/MITOGEN IGNF CONTROL: >10 IU/ML
QFT TB1 AG MINUS NIL: -0.06 IU/ML
QFT TB2 AG MINUS NIL: -0.06 IU/ML

## 2025-05-12 RX ORDER — PRAVASTATIN SODIUM 40 MG
TABLET ORAL
Qty: 90 TABLET | Refills: 0 | OUTPATIENT
Start: 2025-05-12

## 2025-05-12 NOTE — TELEPHONE ENCOUNTER
Please contact patient.  Schedule appointment within the next 6 weeks preferably routine physical in order to refill this medication.

## 2025-05-15 RX ORDER — PRAVASTATIN SODIUM 40 MG
TABLET ORAL
Qty: 90 TABLET | Refills: 3 | OUTPATIENT
Start: 2025-05-15

## 2025-05-15 NOTE — TELEPHONE ENCOUNTER
Susi Maciel MD to [facility]    5/12/25  5:00 PM  Note     Please contact patient.  Schedule appointment within the next 6 weeks preferably routine physical in order to refill this medication.

## 2025-05-19 NOTE — TELEPHONE ENCOUNTER
Patient requesting refill     [id number] - [place], [place] - [address] [place] [address] AT [facility] [address],     Medication Detail    Medication Quantity Refills Start End   pravastatin 40 MG Oral Tab 90 tablet 3 2/28/2024 --   Sig:   TAKE 1 TABLET(40 MG) BY MOUTH EVERY NIGHT;     Route:   (none)     Order #:   [id number]

## 2025-05-21 RX ORDER — PRAVASTATIN SODIUM 40 MG
TABLET ORAL
Qty: 90 TABLET | Refills: 0 | Status: SHIPPED | OUTPATIENT
Start: 2025-05-21

## 2025-06-05 RX ORDER — LISINOPRIL AND HYDROCHLOROTHIAZIDE 12.5; 2 MG/1; MG/1
1 TABLET ORAL DAILY
Qty: 90 TABLET | Refills: 3 | Status: SHIPPED | OUTPATIENT
Start: 2025-06-05

## 2025-08-05 ENCOUNTER — OFFICE VISIT (OUTPATIENT)
Dept: FAMILY MEDICINE CLINIC | Facility: CLINIC | Age: 70
End: 2025-08-05

## 2025-08-05 VITALS
BODY MASS INDEX: 27.63 KG/M2 | HEIGHT: 72 IN | HEART RATE: 65 BPM | OXYGEN SATURATION: 94 % | DIASTOLIC BLOOD PRESSURE: 66 MMHG | WEIGHT: 204 LBS | SYSTOLIC BLOOD PRESSURE: 107 MMHG

## 2025-08-05 DIAGNOSIS — R73.03 PREDIABETES: ICD-10-CM

## 2025-08-05 DIAGNOSIS — H53.9 VISION CHANGES: ICD-10-CM

## 2025-08-05 DIAGNOSIS — I10 ESSENTIAL HYPERTENSION: ICD-10-CM

## 2025-08-05 DIAGNOSIS — Z00.00 ROUTINE PHYSICAL EXAMINATION: Primary | ICD-10-CM

## 2025-08-05 DIAGNOSIS — E78.5 HYPERLIPIDEMIA, UNSPECIFIED HYPERLIPIDEMIA TYPE: ICD-10-CM

## 2025-08-05 LAB
ALBUMIN SERPL-MCNC: 4.7 G/DL (ref 3.2–4.8)
ALBUMIN/GLOB SERPL: 2 (ref 1–2)
ALP LIVER SERPL-CCNC: 66 U/L (ref 45–117)
ALT SERPL-CCNC: 22 U/L (ref 10–49)
ANION GAP SERPL CALC-SCNC: 8 MMOL/L (ref 0–18)
AST SERPL-CCNC: 30 U/L (ref ?–34)
BILIRUB SERPL-MCNC: 1.1 MG/DL (ref 0.2–1.1)
BUN BLD-MCNC: 15 MG/DL (ref 9–23)
BUN/CREAT SERPL: 14.9 (ref 10–20)
CALCIUM BLD-MCNC: 9.5 MG/DL (ref 8.7–10.4)
CHLORIDE SERPL-SCNC: 102 MMOL/L (ref 98–112)
CHOLEST SERPL-MCNC: 156 MG/DL (ref ?–200)
CO2 SERPL-SCNC: 27 MMOL/L (ref 21–32)
COMPLEXED PSA SERPL-MCNC: 2.26 NG/ML (ref ?–4)
CREAT BLD-MCNC: 1.01 MG/DL (ref 0.7–1.3)
DEPRECATED RDW RBC AUTO: 42.2 FL (ref 35.1–46.3)
EGFRCR SERPLBLD CKD-EPI 2021: 82 ML/MIN/1.73M2 (ref 60–?)
ERYTHROCYTE [DISTWIDTH] IN BLOOD BY AUTOMATED COUNT: 13.4 % (ref 11–15)
EST. AVERAGE GLUCOSE BLD GHB EST-MCNC: 140 MG/DL (ref 68–126)
FASTING PATIENT LIPID ANSWER: YES
FASTING STATUS PATIENT QL REPORTED: YES
GLOBULIN PLAS-MCNC: 2.4 G/DL (ref 2–3.5)
GLUCOSE BLD-MCNC: 81 MG/DL (ref 70–99)
HBA1C MFR BLD: 6.5 % (ref ?–5.7)
HCT VFR BLD AUTO: 42.7 % (ref 39–53)
HDLC SERPL-MCNC: 56 MG/DL (ref 40–59)
HGB BLD-MCNC: 14.2 G/DL (ref 13–17.5)
LDLC SERPL CALC-MCNC: 88 MG/DL (ref ?–100)
MCH RBC QN AUTO: 28.7 PG (ref 26–34)
MCHC RBC AUTO-ENTMCNC: 33.3 G/DL (ref 31–37)
MCV RBC AUTO: 86.3 FL (ref 80–100)
NONHDLC SERPL-MCNC: 100 MG/DL (ref ?–130)
OSMOLALITY SERPL CALC.SUM OF ELEC: 284 MOSM/KG (ref 275–295)
PLATELET # BLD AUTO: 136 10(3)UL (ref 150–450)
PLATELETS.RETICULATED NFR BLD AUTO: 4.5 % (ref 0–7)
POTASSIUM SERPL-SCNC: 4 MMOL/L (ref 3.5–5.1)
PROT SERPL-MCNC: 7.1 G/DL (ref 5.7–8.2)
RBC # BLD AUTO: 4.95 X10(6)UL (ref 3.8–5.8)
SODIUM SERPL-SCNC: 137 MMOL/L (ref 136–145)
TRIGL SERPL-MCNC: 62 MG/DL (ref 30–149)
VLDLC SERPL CALC-MCNC: 10 MG/DL (ref 0–30)
WBC # BLD AUTO: 5.4 X10(3) UL (ref 4–11)

## 2025-08-05 PROCEDURE — 3074F SYST BP LT 130 MM HG: CPT | Performed by: FAMILY MEDICINE

## 2025-08-05 PROCEDURE — 3008F BODY MASS INDEX DOCD: CPT | Performed by: FAMILY MEDICINE

## 2025-08-05 PROCEDURE — 3078F DIAST BP <80 MM HG: CPT | Performed by: FAMILY MEDICINE

## 2025-08-05 PROCEDURE — 99397 PER PM REEVAL EST PAT 65+ YR: CPT | Performed by: FAMILY MEDICINE

## 2025-08-20 RX ORDER — PRAVASTATIN SODIUM 40 MG
40 TABLET ORAL NIGHTLY
Qty: 90 TABLET | Refills: 3 | Status: SHIPPED | OUTPATIENT
Start: 2025-08-20

## (undated) NOTE — LETTER
6/28/2022              [address] [place] [id number]         Dear Vibha Lin,    This letter is to inform you that our office has made several attempts to reach you by phone without success. We were attempting to contact you by phone regarding your  prescription request.    You need to make an appointment in order to get medicaton refills. If you make your appointment and need refills to cover you until you are seen, please let our office know. Please contact our office at the number listed below as soon as you receive this letter. Thank you for your cooperation. Sincerely,    Leonard Rome.  [address] [place] [address]   [phone]

## (undated) NOTE — LETTER
4/14/2021    Law Query        [facility] [phone]            Dear Law Query,      Our records indicate that you are due for an appointment for a Colonoscopy in May 2021, or shortly there after, with Nimesh Mcnulty MD.